# Patient Record
Sex: FEMALE | URBAN - METROPOLITAN AREA
[De-identification: names, ages, dates, MRNs, and addresses within clinical notes are randomized per-mention and may not be internally consistent; named-entity substitution may affect disease eponyms.]

---

## 2018-11-28 ENCOUNTER — APPOINTMENT (OUTPATIENT)
Age: 38
Setting detail: DERMATOLOGY
End: 2018-11-29

## 2018-11-28 DIAGNOSIS — L72.0 EPIDERMAL CYST: ICD-10-CM

## 2018-11-28 DIAGNOSIS — L82.0 INFLAMED SEBORRHEIC KERATOSIS: ICD-10-CM

## 2018-11-28 DIAGNOSIS — L81.4 OTHER MELANIN HYPERPIGMENTATION: ICD-10-CM

## 2018-11-28 PROCEDURE — OTHER REASSURANCE: OTHER

## 2018-11-28 PROCEDURE — OTHER BENIGN DESTRUCTION: OTHER

## 2018-11-28 PROCEDURE — OTHER ADDITIONAL NOTES: OTHER

## 2018-11-28 PROCEDURE — OTHER INTRALESIONAL KENALOG: OTHER

## 2018-11-28 PROCEDURE — 17110 DESTRUCT B9 LESION 1-14: CPT

## 2018-11-28 PROCEDURE — 99202 OFFICE O/P NEW SF 15 MIN: CPT | Mod: 25

## 2018-11-28 PROCEDURE — 11900 INJECT SKIN LESIONS </W 7: CPT | Mod: 59

## 2018-11-28 ASSESSMENT — LOCATION DETAILED DESCRIPTION DERM
LOCATION DETAILED: RIGHT INFERIOR CENTRAL MALAR CHEEK
LOCATION DETAILED: LEFT POSTERIOR SHOULDER
LOCATION DETAILED: LEFT MEDIAL DORSAL WRIST
LOCATION DETAILED: LEFT CENTRAL MALAR CHEEK

## 2018-11-28 ASSESSMENT — LOCATION SIMPLE DESCRIPTION DERM
LOCATION SIMPLE: LEFT SHOULDER
LOCATION SIMPLE: LEFT CHEEK
LOCATION SIMPLE: LEFT WRIST
LOCATION SIMPLE: RIGHT CHEEK

## 2018-11-28 ASSESSMENT — LOCATION ZONE DERM
LOCATION ZONE: FACE
LOCATION ZONE: ARM

## 2018-11-28 NOTE — PROCEDURE: BENIGN DESTRUCTION
Add 52 Modifier (Optional): no
Treatment Number (Will Not Render If 0): 0
Medical Necessity Information: It is in your best interest to select a reason for this procedure from the list below. All of these items fulfill various CMS LCD requirements except the new and changing color options.
Render Post-Care Instructions In Note?: yes
Post-Care Instructions: I reviewed with the patient in detail post-care instructions. Patient is to wear sunprotection, and avoid picking at any of the treated lesions. Pt may apply Vaseline to crusted or scabbing areas.
Anesthesia Volume In Cc: 0.5
Consent: The patient's consent was obtained including but not limited to risks of crusting, scabbing, blistering, scarring, darker or lighter pigmentary change, recurrence, incomplete removal and infection.
Medical Necessity Clause: This procedure was medically necessary because the lesions that were treated were:
Detail Level: Detailed

## 2018-11-28 NOTE — PROCEDURE: INTRALESIONAL KENALOG
Treatment Number (Optional): 1
Detail Level: Simple
X Size Of Lesion In Cm (Optional): 0
Administered By (Optional): nurse
Kenalog Preparation: Kenalog
Consent: The risks of atrophy were reviewed with the patient.
Medical Necessity Clause: This procedure was medically necessary because the lesions that were treated were:
Concentration Of Solution Injected (Mg/Ml): 3.0
Include Z78.9 (Other Specified Conditions Influencing Health Status) As An Associated Diagnosis?: No

## 2018-11-28 NOTE — HPI: CYST
How Severe Is Your Cyst?: moderate
Is This A New Presentation, Or A Follow-Up?: Cyst
Additional History: Has drained 3 times marisol past 6 years.  Has been taking cephalexin.

## 2018-12-20 ENCOUNTER — APPOINTMENT (OUTPATIENT)
Age: 38
Setting detail: DERMATOLOGY
End: 2018-12-21

## 2018-12-20 DIAGNOSIS — L81.4 OTHER MELANIN HYPERPIGMENTATION: ICD-10-CM

## 2018-12-20 DIAGNOSIS — L72.0 EPIDERMAL CYST: ICD-10-CM

## 2018-12-20 PROCEDURE — OTHER REASSURANCE: OTHER

## 2018-12-20 PROCEDURE — 11402 EXC TR-EXT B9+MARG 1.1-2 CM: CPT

## 2018-12-20 PROCEDURE — 12031 INTMD RPR S/A/T/EXT 2.5 CM/<: CPT

## 2018-12-20 PROCEDURE — OTHER PUNCH EXCISION: OTHER

## 2018-12-20 PROCEDURE — 99213 OFFICE O/P EST LOW 20 MIN: CPT | Mod: 25

## 2018-12-20 ASSESSMENT — LOCATION ZONE DERM
LOCATION ZONE: FACE
LOCATION ZONE: TRUNK

## 2018-12-20 ASSESSMENT — LOCATION SIMPLE DESCRIPTION DERM
LOCATION SIMPLE: RIGHT CHEEK
LOCATION SIMPLE: LEFT CHEEK
LOCATION SIMPLE: LEFT UPPER BACK

## 2018-12-20 ASSESSMENT — LOCATION DETAILED DESCRIPTION DERM
LOCATION DETAILED: LEFT SUPERIOR UPPER BACK
LOCATION DETAILED: RIGHT INFERIOR CENTRAL MALAR CHEEK
LOCATION DETAILED: LEFT CENTRAL MALAR CHEEK

## 2018-12-20 NOTE — PROCEDURE: PUNCH EXCISION
Deep Sutures: 3-0 Vicryl
X Size Of Lesion In Cm (Optional): 0
Purse String (Intermediate) Text: Given the location of the defect and the characteristics of the surrounding skin a purse string intermediate closure was deemed most appropriate.  Undermining was performed circumfirentially around the surgical defect.  A purse string suture was then placed and tightened.
Billing Type: Third-Party Bill
4.5 Mm Punch Excision Text: A 4.5 mm punch biopsy was used to make an initial incision over the lesion.  After this overlying column of skin was removed, blunt dissection was used to free the lesion from the surrounding tissues and the lesion was extirpated through the surgical opening made by the punch biopsy.
10 Mm Punch Excision Text: A 10 mm punch biopsy was used to make an initial incision over the lesion.  After this overlying column of skin was removed, blunt dissection was used to free the lesion from the surrounding tissues and the lesion was extirpated through the surgical opening made by the punch biopsy.
Suture Removal: 14 days
6 Mm Punch Excision Text: A 6 mm punch biopsy was used to make an initial incision over the lesion.  After this overlying column of skin was removed, blunt dissection was used to free the lesion from the surrounding tissues and the lesion was extirpated through the surgical opening made by the punch biopsy.
Hemostasis: Pressure
12 Mm Punch Excision Text: A 12 mm punch biopsy was used to make an initial incision over the lesion.  After this overlying column of skin was removed, blunt dissection was used to free the lesion from the surrounding tissues and the lesion was extirpated through the surgical opening made by the punch biopsy.
Dressing: pressure dressing
5 Mm Punch Excision Text: A 5 mm punch biopsy was used to make an initial incision over the lesion.  After this overlying column of skin was removed, blunt dissection was used to free the lesion from the surrounding tissues and the lesion was extirpated through the surgical opening made by the punch biopsy.
Render Post-Care Instructions In Note?: yes
3.5 Mm Punch Excision Text: A 3.5 mm punch biopsy was used to make an initial incision over the lesion.  After this overlying column of skin was removed, blunt dissection was used to free the lesion from the surrounding tissues and the lesion was extirpated through the surgical opening made by the punch biopsy.
Anesthesia Type: 1% lidocaine with epinephrine
4 Mm Punch Excision Text: A 4 mm punch biopsy was used to make an initial incision over the lesion.  After this overlying column of skin was removed, blunt dissection was used to free the lesion from the surrounding tissues and the lesion was extirpated through the surgical opening made by the punch biopsy.
Wound Care: Petrolatum
Intermediate Repair Preamble Text (Leave Blank If You Do Not Want): Undermining was performed with blunt dissection.
Medical Necessity Clause: The excision was medically necessary because the lesion which was excised was
Bill For Surgical Tray: no
Size Of Lesion In Cm: 2
Repair Type: Intermediate
Epidermal Sutures: 3-0 Nylon
1.5 Mm Punch Excision Text: A 1.5 mm punch biopsy was used to make an initial incision over the lesion.  After this overlying column of skin was removed, blunt dissection was used to free the lesion from the surrounding tissues and the lesion was extirpated through the surgical opening made by the punch biopsy.
Estimated Blood Loss (Cc): 2 cc
Path Notes (To The Dermatopathologist): Please check margins.
7 Mm Punch Excision Text: A 7 mm punch biopsy was used to make an initial incision over the lesion.  After this overlying column of skin was removed, blunt dissection was used to free the lesion from the surrounding tissues and the lesion was extirpated through the surgical opening made by the punch biopsy.
2 Mm Punch Excision Text: A 2 mm punch biopsy was used to make an initial incision over the lesion.  After this overlying column of skin was removed, blunt dissection was used to free the lesion from the surrounding tissues and the lesion was extirpated through the surgical opening made by the punch biopsy.
Medical Necessity Clause: This procedure was medically necessary because the lesion that was treated was:
Consent was obtained from the patient. The risks and benefits to therapy were discussed in detail. Specifically, the risks of infection, scarring, bleeding, prolonged wound healing, incomplete removal, allergy to anesthesia, nerve injury and recurrence were addressed. Prior to the procedure, the treatment site was clearly identified and confirmed by the patient. All components of Universal Protocol/PAUSE Rule completed.
8 Mm Punch Excision Text: A 8 mm punch biopsy was used to make an initial incision over the lesion.  After this overlying column of skin was removed, blunt dissection was used to free the lesion from the surrounding tissues and the lesion was extirpated through the surgical opening made by the punch biopsy.
Excision Depth: adipose tissue
Anesthesia Type: 0.5% marcaine
Post-Care Instructions: I reviewed with the patient in detail post-care instructions. Patient is not to engage in any heavy lifting, exercise, or swimming for the next 14 days. Should the patient develop any fevers, chills, bleeding, severe pain patient will contact the office immediately.
2.5 Mm Punch Excision Text: A 2.5 mm punch biopsy was used to make an initial incision over the lesion.  After this overlying column of skin was removed, blunt dissection was used to free the lesion from the surrounding tissues and the lesion was extirpated through the surgical opening made by the punch biopsy.
Intermediate / Complex Repair - Final Wound Length In Cm: 1.6
3 Mm Punch Excision Text: A 3 mm punch biopsy was used to make an initial incision over the lesion.  After this overlying column of skin was removed, blunt dissection was used to free the lesion from the surrounding tissues and the lesion was extirpated through the surgical opening made by the punch biopsy.
Epidermal Closure: simple interrupted
Complex Repair Preamble Text (Leave Blank If You Do Not Want): Extensive wide undermining was performed.
Anesthesia Volume In Cc: 4
Excision Method: 8 mm Punch
Additional Anesthesia Volume In Cc: 3
Detail Level: Detailed

## 2019-01-03 ENCOUNTER — APPOINTMENT (OUTPATIENT)
Age: 39
Setting detail: DERMATOLOGY
End: 2019-01-03

## 2019-01-03 DIAGNOSIS — Z48.02 ENCOUNTER FOR REMOVAL OF SUTURES: ICD-10-CM

## 2019-01-03 PROCEDURE — OTHER SUTURE REMOVAL (GLOBAL PERIOD): OTHER

## 2019-01-03 ASSESSMENT — LOCATION ZONE DERM: LOCATION ZONE: TRUNK

## 2019-01-03 ASSESSMENT — LOCATION SIMPLE DESCRIPTION DERM: LOCATION SIMPLE: LEFT UPPER BACK

## 2019-01-03 ASSESSMENT — LOCATION DETAILED DESCRIPTION DERM: LOCATION DETAILED: LEFT SUPERIOR UPPER BACK

## 2019-01-03 NOTE — PROCEDURE: SUTURE REMOVAL (GLOBAL PERIOD)
Add 03099 Cpt? (Important Note: In 2017 The Use Of 23921 Is Being Tracked By Cms To Determine Future Global Period Reimbursement For Global Periods): no
Detail Level: Detailed

## 2019-05-16 PROBLEM — E66.01 OBESITY, MORBID (HCC): Status: ACTIVE | Noted: 2019-05-16

## 2019-06-25 ENCOUNTER — OFFICE VISIT (OUTPATIENT)
Dept: CARDIOLOGY CLINIC | Age: 39
End: 2019-06-25

## 2019-06-25 VITALS
HEIGHT: 68 IN | BODY MASS INDEX: 44.41 KG/M2 | WEIGHT: 293 LBS | OXYGEN SATURATION: 98 % | RESPIRATION RATE: 16 BRPM | SYSTOLIC BLOOD PRESSURE: 114 MMHG | DIASTOLIC BLOOD PRESSURE: 82 MMHG | HEART RATE: 70 BPM

## 2019-06-25 DIAGNOSIS — G90.9 AUTONOMIC DISORDER: ICD-10-CM

## 2019-06-25 DIAGNOSIS — E78.2 MIXED HYPERLIPIDEMIA: ICD-10-CM

## 2019-06-25 DIAGNOSIS — E78.2 ELEVATED TRIGLYCERIDES WITH HIGH CHOLESTEROL: ICD-10-CM

## 2019-06-25 DIAGNOSIS — I10 ESSENTIAL HYPERTENSION: ICD-10-CM

## 2019-06-25 DIAGNOSIS — E11.42 TYPE 2 DIABETES MELLITUS WITH DIABETIC POLYNEUROPATHY, WITH LONG-TERM CURRENT USE OF INSULIN (HCC): Primary | ICD-10-CM

## 2019-06-25 DIAGNOSIS — E66.01 MORBID OBESITY (HCC): ICD-10-CM

## 2019-06-25 DIAGNOSIS — Z79.4 TYPE 2 DIABETES MELLITUS WITH DIABETIC POLYNEUROPATHY, WITH LONG-TERM CURRENT USE OF INSULIN (HCC): Primary | ICD-10-CM

## 2019-06-25 DIAGNOSIS — M54.30 SCIATICA, UNSPECIFIED LATERALITY: ICD-10-CM

## 2019-06-25 DIAGNOSIS — R00.0 TACHYCARDIA, UNSPECIFIED: ICD-10-CM

## 2019-06-25 RX ORDER — MEGESTROL ACETATE 20 MG/1
20 TABLET ORAL 2 TIMES DAILY
COMMUNITY
End: 2022-09-18

## 2019-06-25 NOTE — PROGRESS NOTES
Mary Jo Villagomez is a 35 y.o. female is here to establish local cardiac care, recently moved here from [place]. Complex medical hx with uncontrolled DM II--now on insulin pump, morbid obesity, hypertension, inappropriate (sinus) tachycardia, autonomic dysfunction, dyslipidemia (w/ elev TG/metabolic syndrome low HDL), peripheral neuropathy(lyrica/tramadol), GERD, vit D deficiency. Prior cardiac testing in [place] about 8 mos ago with Echo/doppler--reportedly ok, attempted Tilt Table study, ??stress. On metoprolol for tachy (and ortho), dose recently increased to 50mg bid. PCP at [facility]. Labs and OV 5/29/19--HbA1c 8.9, chol 141, LDL 53, HDL 27,  on crestor 10 + lofibra 160.  ? Not currently on ACEI or ARB? Limited outside records available. No hx thryoid. No hx JOHANNE or w/u (says would refuse CPAP anyway). Back pain/sciatica, knee pain, peripheral neuropathy, ?fibromyalgia limited physical.  The patient denies chest pain/ shortness of breath, orthopnea, PND, LE edema, palpitations, syncope, presyncope or fatigue.        Patient Active Problem List    Diagnosis Date Noted    Diabetes ([address]     HTN (hypertension)     Hyperlipemia     Peripheral neuropathy     Morbid obesity ([address]     GERD (gastroesophageal reflux disease)     Elevated triglycerides with high cholesterol     Obesity, morbid ([address] 05/16/2019      Alize Rondon NP  Past Medical History:   Diagnosis Date    Depression     Diabetes ([address]     on insulin pump    Dysmenorrhea     Elevated triglycerides with high cholesterol     GERD (gastroesophageal reflux disease)     HTN (hypertension)     Hyperlipemia     Morbid obesity ([address]     Peripheral neuropathy     Vitamin D deficiency       Past Surgical History:   Procedure Laterality Date    HX LAP CHOLECYSTECTOMY      HX SEPTOPLASTY      HX TONSIL AND ADENOIDECTOMY       Allergies   Allergen Reactions    Lidocaine Rash and Swelling    Advair Diskus [Fluticasone Propion-Salmeterol] Rash    Pcn [Penicillins] Unknown (comments)    Sulfa (Sulfonamide Antibiotics) Rash and Swelling      Family History   Problem Relation Age of Onset    Diabetes Mother     Hypertension Father     Diabetes Father     Cancer Maternal Grandmother       Social History     Socioeconomic History    Marital status: SINGLE     Spouse name: Not on file    Number of children: Not on file    Years of education: Not on file    Highest education level: Not on file   Occupational History    Not on file   Social Needs    Financial resource strain: Not on file    Food insecurity:     Worry: Not on file     Inability: Not on file    Transportation needs:     Medical: Not on file     Non-medical: Not on file   Tobacco Use    Smoking status: Never Smoker    Smokeless tobacco: Never Used   Substance and Sexual Activity    Alcohol use: Never     Frequency: Never    Drug use: Not on file    Sexual activity: Not on file   Lifestyle    Physical activity:     Days per week: Not on file     Minutes per session: Not on file    Stress: Not on file   Relationships    Social connections:     Talks on phone: Not on file     Gets together: Not on file     Attends Yazidi service: Not on file     Active member of club or organization: Not on file     Attends meetings of clubs or organizations: Not on file     Relationship status: Not on file    Intimate partner violence:     Fear of current or ex partner: Not on file     Emotionally abused: Not on file     Physically abused: Not on file     Forced sexual activity: Not on file   Other Topics Concern    Not on file   Social History Narrative    Not on file      Current Outpatient Medications   Medication Sig    megestrol (MEGACE) 20 mg tablet Take 20 mg by mouth two (2) times a day.  omeprazole (PRILOSEC) 40 mg capsule Take 1 Cap by mouth daily.  furosemide (LASIX) 20 mg tablet Take one daily as needed for lower extremity edema.   Indications: visible water retention    traMADol (ULTRAM) 50 mg tablet Take 50 mg by mouth every six (6) hours as needed.  paliperidone palmitate (INVEGA SUSTENNA) 78 mg/0.5 mL injection by IntraMUSCular route once. Extended release    potassium chloride SR (KLOR-CON 8) 8 mEq tablet Take 1 Tab by mouth two (2) times a day.  metoprolol tartrate (LOPRESSOR) 50 mg tablet Take 1 Tab by mouth two (2) times a day.  ergocalciferol (ERGOCALCIFEROL) 50,000 unit capsule Take 1 Cap by mouth every seven (7) days. Indications: Vitamin D Deficiency (High Dose Therapy)    fenofibrate (LOFIBRA) 160 mg tablet Take 1 Tab by mouth daily. Indications: high amount of triglyceride in the blood    meloxicam (MOBIC) 7.5 mg tablet Take 2 Tabs by mouth daily. Indications: Joint Damage causing Pain and Loss of Function    rosuvastatin (CRESTOR) 10 mg tablet Take 1 Tab by mouth nightly.  nystatin (MYCOSTATIN) 100,000 unit/mL suspension Take 2 mL by mouth four (4) times daily. swish and spit  Indications: thrush    pregabalin (LYRICA) 75 mg capsule Take 1 Cap by mouth three (3) times daily. Max Daily Amount: 225 mg. Indications: Diabetic Complication causing Injury to some Body Nerves    insulin CONCENTRATED regular (HUMULIN R U-500) 500 unit/mL soln 1.6 Units by SubCUTAneous route continuous. Via pump   Indications: Diabetes Mellitus with Severe Insulin Resistance     No current facility-administered medications for this visit. Review of Symptoms:    CONST  No weight change. No fever, chills, sweats    ENT No visual changes, URI sx, sore throat    CV  See HPI   RESP  No cough, or sputum, wheezing. Also see HPI   GI  No abdominal pain or change in bowel habits. No heartburn or dysphagia. No melena or rectal bleeding.   No dysuria, urgency, frequency, hematuria   MSKEL  No joint pain, swelling. No muscle pain. SKIN  No rash or lesions. NEURO  No headache, syncope, or seizure.    No weakness, loss of sensation, or paresthesias. PSYCH  No low mood or depression  No anxiety. HE/LYMPH  No easy bruising, abnormal bleeding, or enlarged glands. Physical ExamPhysical Exam:    Visit Vitals  /82 (BP 1 Location: Right arm, BP Patient Position: Sitting)   Pulse 70   Resp 16   Ht 5' 7.5\" (1.715 m)   Wt 349 lb (158.3 kg)   LMP 06/18/2019   SpO2 98% Comment: ra   BMI 53.85 kg/m²     Gen: NAD obese WF  HEENT:  PERRL, throat clear  Neck: no adenopathy, no thyromegaly, no JVD   Heart:  Regular,Nl S1S2,  no murmur, gallop or rub.   Lungs:  clear  Abdomen:   Soft, non-tender, bowel sounds are active.   Extremities:  No edema  Pulse: symmetric  Neuro: A&O times 3, No focal neuro deficits    Cardiographics    ECG: NSR, PRWP    Labs:   Lab Results   Component Value Date/Time    Sodium 146 (H) 05/29/2019 12:00 AM    Potassium 4.6 05/29/2019 12:00 AM    Chloride 101 05/29/2019 12:00 AM    CO2 26 05/29/2019 12:00 AM    Glucose 115 (H) 05/29/2019 12:00 AM    BUN 12 05/29/2019 12:00 AM    Creatinine 0.87 05/29/2019 12:00 AM    BUN/Creatinine ratio 14 05/29/2019 12:00 AM    GFR est AA 96 05/29/2019 12:00 AM    GFR est non-AA 84 05/29/2019 12:00 AM    Calcium 9.6 05/29/2019 12:00 AM    Bilirubin, total 0.2 05/29/2019 12:00 AM    AST (SGOT) 22 05/29/2019 12:00 AM    Alk. phosphatase 75 05/29/2019 12:00 AM    Protein, total 7.0 05/29/2019 12:00 AM    Albumin 4.2 05/29/2019 12:00 AM    A-G Ratio 1.5 05/29/2019 12:00 AM    ALT (SGPT) 21 05/29/2019 12:00 AM     No results found for: CPK, CPKX, CPX  Lab Results   Component Value Date/Time    Cholesterol, total 141 05/29/2019 12:00 AM    HDL Cholesterol 27 (L) 05/29/2019 12:00 AM    LDL, calculated 53 05/29/2019 12:00 AM    Triglyceride 307 (H) 05/29/2019 12:00 AM     No results found for this or any previous visit.     Assessment:         Patient Active Problem List    Diagnosis Date Noted    Diabetes ([facility]     HTN (hypertension)     Hyperlipemia     Peripheral neuropathy     Morbid obesity ([facility]     GERD (gastroesophageal reflux disease)     Elevated triglycerides with high cholesterol     Obesity, morbid ([facility] 05/16/2019     1 y.o. female is here to establish local cardiac care, recently moved here from [place]. Complex medical hx with uncontrolled DM II--now on insulin pump, morbid obesity, hypertension, inappropriate (sinus) tachycardia, autonomic dysfunction, dyslipidemia (w/ elev TG/metabolic syndrome low HDL), peripheral neuropathy(lyrica/tramadol), GERD, vit D deficiency. Prior cardiac testing in [place] about 8 mos ago with Echo/doppler--reportedly ok, attempted Tilt Table study, ??stress. On metoprolol for tachy (and ortho), dose recently increased to 50mg bid. PCP at [facility]. Labs and OV 5/29/19--HbA1c 8.9, chol 141, LDL 53, HDL 27,  on crestor 10 + lofibra 160.  ? Not currently on ACEI or ARB? Limited outside records available. No hx thryoid. No hx JOHANNE or w/u (says would refuse CPAP anyway). Back pain/sciatica, knee pain, peripheral neuropathy, ?fibromyalgia limited physical.      Plan: Will obtain/review outside Cardiology testing/notes (Echo 8 mos ago, Tilt, ? Stress)  Continue metoprolol 50mg bid (tachy, autonomic)  Says BP drops and runs low, ?? Very low dose ACEI or ARB for renal protection  Continue statin and fenofibrate  Endocrine/DM management, pump, etc  Discussed sleep test/JOHANNE--declines  Dietary rx  Continue current care and f/u in 6 months.     Nate Miranda MD

## 2019-06-25 NOTE — PROGRESS NOTES
Verified patient with two patient identifiers. Medications reviewed/approved by Dr. Beth Dahl. A verbal from Dr. Beth Dahl was given to remove any medications that were deleted during the visit. Medication(s) removed:  None    Chief Complaint   Patient presents with    Establish Care     New patient evaluation    Hypertension    Cholesterol Problem     1. Have you been to the ER, urgent care clinic since your last visit? Hospitalized since your last visit? new pt    2. Have you seen or consulted any other health care providers outside of the [address] [place] [address] since your last visit? Include any pap smears or colon screening.  New pt

## 2019-06-27 ENCOUNTER — TELEPHONE (OUTPATIENT)
Dept: CARDIOLOGY CLINIC | Age: 39
End: 2019-06-27

## 2019-06-27 NOTE — TELEPHONE ENCOUNTER
Called back to give you the Doctor's info and Tn and she has a few questions.   Please call [phone]

## 2019-06-27 NOTE — TELEPHONE ENCOUNTER
Spoke with the pt. Verified patient with two patient identifiers. Fax for [facility] is [phone]. Pt also needs cardiac clearance per Dr. Earle Johnson and Nicholas Burns NP. Advised that the office should call Dr. Marisol Aguiar office regarding so we know what type of surgery (scheduled for mid July). Also, advised that we need her [address] [place] [address] records. Faxed consent and confirmation has been received.

## 2019-07-05 PROBLEM — L02.91 ABSCESS: Status: ACTIVE | Noted: 2019-07-05

## 2019-07-10 ENCOUNTER — OFFICE VISIT (OUTPATIENT)
Dept: SURGERY | Age: 39
End: 2019-07-10

## 2019-07-10 VITALS
WEIGHT: 293 LBS | HEIGHT: 68 IN | SYSTOLIC BLOOD PRESSURE: 113 MMHG | HEART RATE: 86 BPM | DIASTOLIC BLOOD PRESSURE: 71 MMHG | TEMPERATURE: 97.9 F | BODY MASS INDEX: 44.41 KG/M2

## 2019-07-10 DIAGNOSIS — L02.91 ABSCESS: Primary | ICD-10-CM

## 2019-07-10 NOTE — PROGRESS NOTES
Chelsea Llamas is a 35 y.o. female who presents today with the following:  Chief Complaint   Patient presents with    Skin Problem     under arm       HPI    69-year-old diabetic female who presents to the office as a referral by Dr. Radha Bloom for evaluation of a left axillary abscess. According to the patient about 2 to 3 weeks ago she noticed some erythema in this area that fairly rapidly increased in size. She believes it was a spider bite because she states that her father has had multiple spider bites and stated that this looks similar. She was seen by Dr. bethea and treated with a course of antibiotics. She states that she did not have improvement and was seen back in the office and switch to clindamycin. She has had some spontaneous drainage. There was some concern because she ended up having an area of necrosis in the left axilla and she was referred to us for evaluation. The patient states that she has not had any spontaneous drainage recently. She underwent an ultrasound which showed a 1.7 cm fluid collection without any hypervascularity.     Past Medical History:   Diagnosis Date    Depression     Diabetes ([address]     on insulin pump    Dysmenorrhea     Elevated triglycerides with high cholesterol     GERD (gastroesophageal reflux disease)     HTN (hypertension)     Hyperlipemia     Morbid obesity (HCC)     Peripheral neuropathy     Vitamin D deficiency        Past Surgical History:   Procedure Laterality Date    HX LAP CHOLECYSTECTOMY      HX SEPTOPLASTY      HX TONSIL AND ADENOIDECTOMY         Social History     Socioeconomic History    Marital status: SINGLE     Spouse name: Not on file    Number of children: Not on file    Years of education: Not on file    Highest education level: Not on file   Occupational History    Not on file   Social Needs    Financial resource strain: Not on file    Food insecurity:     Worry: Not on file     Inability: Not on file    Transportation needs: Medical: Not on file     Non-medical: Not on file   Tobacco Use    Smoking status: Never Smoker    Smokeless tobacco: Never Used   Substance and Sexual Activity    Alcohol use: Never     Frequency: Never    Drug use: Not on file    Sexual activity: Not on file   Lifestyle    Physical activity:     Days per week: Not on file     Minutes per session: Not on file    Stress: Not on file   Relationships    Social connections:     Talks on phone: Not on file     Gets together: Not on file     Attends [employer] service: Not on file     Active member of club or organization: Not on file     Attends meetings of clubs or organizations: Not on file     Relationship status: Not on file    Intimate partner violence:     Fear of current or ex partner: Not on file     Emotionally abused: Not on file     Physically abused: Not on file     Forced sexual activity: Not on file   Other Topics Concern    Not on file   Social History Narrative    Not on file       Family History   Problem Relation Age of Onset    Diabetes Mother     Hypertension Father     Diabetes Father     Cancer Maternal Grandmother        Allergies   Allergen Reactions    Lidocaine Rash and Swelling    Advair Diskus [Fluticasone Propion-Salmeterol] Rash    Pcn [Penicillins] Unknown (comments)    Sulfa (Sulfonamide Antibiotics) Rash and Swelling       Current Outpatient Medications   Medication Sig    clindamycin (CLEOCIN) 300 mg capsule Take 2 Caps by mouth three (3) times daily for 10 days.  megestrol (MEGACE) 20 mg tablet Take 20 mg by mouth two (2) times a day.  nystatin (MYCOSTATIN) 100,000 unit/mL suspension Take 2 mL by mouth four (4) times daily. swish and spit  Indications: thrush    omeprazole (PRILOSEC) 40 mg capsule Take 1 Cap by mouth daily.  furosemide (LASIX) 20 mg tablet Take one daily as needed for lower extremity edema.   Indications: visible water retention    pregabalin (LYRICA) 75 mg capsule Take 1 Cap by mouth three (3) times daily. Max Daily Amount: 225 mg. Indications: Diabetic Complication causing Injury to some Body Nerves    traMADol (ULTRAM) 50 mg tablet Take 50 mg by mouth every six (6) hours as needed.  paliperidone palmitate (INVEGA SUSTENNA) 78 mg/0.5 mL injection by IntraMUSCular route once. Extended release    potassium chloride SR (KLOR-CON 8) 8 mEq tablet Take 1 Tab by mouth two (2) times a day.  metoprolol tartrate (LOPRESSOR) 50 mg tablet Take 1 Tab by mouth two (2) times a day.  ergocalciferol (ERGOCALCIFEROL) 50,000 unit capsule Take 1 Cap by mouth every seven (7) days. Indications: Vitamin D Deficiency (High Dose Therapy)    fenofibrate (LOFIBRA) 160 mg tablet Take 1 Tab by mouth daily. Indications: high amount of triglyceride in the blood    meloxicam (MOBIC) 7.5 mg tablet Take 2 Tabs by mouth daily. Indications: Joint Damage causing Pain and Loss of Function    rosuvastatin (CRESTOR) 10 mg tablet Take 1 Tab by mouth nightly.  insulin CONCENTRATED regular (HUMULIN R U-500) 500 unit/mL soln 1.6 Units by SubCUTAneous route continuous. Via pump   Indications: Diabetes Mellitus with Severe Insulin Resistance     No current facility-administered medications for this visit. The above histories, medications and allergies have been reviewed. ROS    Visit Vitals  /71 (BP 1 Location: Left arm, BP Patient Position: Sitting)   Pulse 86   Temp 97.9 °F (36.6 °C) (Oral)   Ht 5' 7.5\" (1.715 m)   Wt 347 lb (157.4 kg)   LMP 06/18/2019   BMI 53.55 kg/m²     Physical Exam   Psychiatric:   In evaluation of the left axilla she has an open area that measures approximately 1-1/2 cm. There is a little bit of fibrinous exudate. This area is gently cleansed with a cotton-tipped applicator and peroxide. This opens into a small cavity but there is no residual pus seen in this. There is minimal induration and no significant erythema. I do not appreciate any residual fluctuance.        Indication: Evaluate for abscess     TECHNIQUE: Superficial ultrasound of the left posterior axilla/left upper flank.     COMPARISON: None     IMPRESSION  FINDINGS/IMPRESSION:  In the patient's area of concern of the left posterior axilla/left upper flank,  there is a focal area of skin thickening/subcutaneous edema with a very  superficial subcutaneous horizontally oriented fluid collection measuring  approximately 1.7 x 0.6 x 0.4 cm. No significant surrounding increased  Vascularity. 1. Abscess  Appears to have been adequately drained. There is minimal induration and erythema. I agree with continuing the course of antibiotics until completed. I have instructed the patient on local wound care. She is currently using warm compresses and therefore she should continue to do this. She states that she cannot shower based on her home situation. Told her if she develops any worsening redness or erythema we will be more than happy to see her back. Follow-up and Dispositions    · Return if symptoms worsen or fail to improve.          Seth Olea MD

## 2019-07-10 NOTE — PATIENT INSTRUCTIONS
Skin Abscess: Care Instructions  Your Care Instructions    A skin abscess is a bacterial infection that forms a pocket of pus. A boil is a kind of skin abscess. The doctor may have cut an opening in the abscess so that the pus can drain out. You may have gauze in the cut so that the abscess will stay open and keep draining. You may need antibiotics. You will need to follow up with your doctor to make sure the infection has gone away. The doctor has checked you carefully, but problems can develop later. If you notice any problems or new symptoms, get medical treatment right away. Follow-up care is a key part of your treatment and safety. Be sure to make and go to all appointments, and call your doctor if you are having problems. It's also a good idea to know your test results and keep a list of the medicines you take. How can you care for yourself at home? · Apply warm and dry compresses, a heating pad set on low, or a hot water bottle 3 or 4 times a day for pain. Keep a cloth between the heat source and your skin. · If your doctor prescribed antibiotics, take them as directed. Do not stop taking them just because you feel better. You need to take the full course of antibiotics. · Take pain medicines exactly as directed. ? If the doctor gave you a prescription medicine for pain, take it as prescribed. ? If you are not taking a prescription pain medicine, ask your doctor if you can take an over-the-counter medicine. · Keep your bandage clean and dry. Change the bandage whenever it gets wet or dirty, or at least one time a day. · If the abscess was packed with gauze:  ? Keep follow-up appointments to have the gauze changed or removed. If the doctor instructed you to remove the gauze, follow the instructions you were given for how to remove it. ? After the gauze is removed, soak the area in warm water for 15 to 20 minutes 2 times a day, until the wound closes. When should you call for help?   Call your doctor now or seek immediate medical care if:    · You have signs of worsening infection, such as:  ? Increased pain, swelling, warmth, or redness. ? Red streaks leading from the infected skin. ? Pus draining from the wound. ? A fever.    Watch closely for changes in your health, and be sure to contact your doctor if:    · You do not get better as expected. Where can you learn more? Go to http://keon-carmen.info/. Enter L145 in the search box to learn more about \"Skin Abscess: Care Instructions. \"  Current as of: April 17, 2018  Content Version: 11.9  © 5392-9447 [employer]. Care instructions adapted under license by [employer] (which disclaims liability or warranty for this information). If you have questions about a medical condition or this instruction, always ask your healthcare professional. [address] any warranty or liability for your use of this information.

## 2019-07-10 NOTE — LETTER
7/10/2019 1:38 PM 
 
Patient:  Renata Jo YOB: 1980 Date of Visit: 7/10/2019 Dear Owen Henderson DO 
[address] [facility] [address] [address] VIA In Basket Lenora David NP 
[address] [facility] [address] VIA In Basket 
 : Thank you for referring Ms. Thom Weems to me for evaluation/treatment. Below are the relevant portions of my assessment and plan of care. The area of concern in the left axilla appears to have been drained adequately. She has no significant residual induration or erythema. This ultrasound done did not show a significant fluid collection. A small 1.7 cm area had been seen but I believe this is what is opened to the skin surface. I have encouraged her to continue with the warm compresses and complete the course of antibiotics. I do not think she will require any further intervention. If she develops worsening redness or induration or fluctuance I would be more than happy to see her again. If you have questions, please do not hesitate to call me. I look forward to following Ms. Td Rose along with you.  
 
 
 
Sincerely, 
 
 
June Hardin MD

## 2019-07-11 NOTE — TELEPHONE ENCOUNTER
Surgery: 8/14/19 with Dr. Tahira Herbertam - Hysterectomy D&C w/ possible myosure    Asked Dr. Joseph Rogers regarding review of records. Awaiting his response.

## 2019-07-15 ENCOUNTER — TELEPHONE (OUTPATIENT)
Dept: CARDIOLOGY CLINIC | Age: 39
End: 2019-07-15

## 2019-07-15 NOTE — TELEPHONE ENCOUNTER
Patient called stating she needs Cardiac Clearance for a Female Surgery Chart indicates Hysteroscopy Dilation & Curettage. Please call [phone].

## 2019-07-16 NOTE — TELEPHONE ENCOUNTER
Per Dr. Zee Bhatia:  Outside records reviewed--clear for planned surgery from cardiac standpoint at low cardiac risk[place] [facility] [place]     [place] filled out by Dr. Zee Bhatia. Faxed to [phone]. Confirmation received.

## 2019-08-07 PROBLEM — E11.40 TYPE 2 DIABETES MELLITUS WITH DIABETIC NEUROPATHY (HCC): Status: ACTIVE | Noted: 2019-08-07

## 2019-08-07 RX ORDER — POTASSIUM CHLORIDE 600 MG/1
1 TABLET, FILM COATED, EXTENDED RELEASE ORAL 2 TIMES DAILY
COMMUNITY
Start: 2019-05-16 | End: 2022-09-12

## 2019-08-07 RX ORDER — VALACYCLOVIR HYDROCHLORIDE 500 MG/1
1 TABLET, FILM COATED ORAL 2 TIMES DAILY
COMMUNITY
Start: 2019-08-06 | End: 2022-09-18

## 2019-08-07 NOTE — PERIOP NOTES
[facility]  Ambulatory Surgery Unit  Pre-operative Instructions    Surgery/Procedure Date  8/14            Tentative Arrival Time 12:15 am      1. On the day of your surgery/procedure, please report to the Ambulatory Surgery Unit Registration Desk and sign in at your designated time. The Ambulatory Surgery Unit is located in [facility] on the Critical access hospital side of the [facility] across from the [address] [place] [address]. Please have all of your health insurance cards and a photo ID. 2. You must have someone with you to drive you home, as you should not drive a car for 24 hours following anesthesia. Please make arrangements for a responsible adult friend or family member to stay with you for at least the first 24 hours after your surgery. 3. Do not have anything to eat or drink (including water, gum, mints, coffee, juice) after 11:59 PM  8/13. This may not apply to medications prescribed by your physician. (Please note below the special instructions with medications to take the morning of surgery, if applicable.)    4. We recommend you do not drink any alcoholic beverages for 24 hours before and after your surgery. 5. Contact your surgeons office for instructions on the following medications: non-steroidal anti-inflammatory drugs (i.e. Advil, Aleve), vitamins, and supplements. (Some surgeons will want you to stop these medications prior to surgery and others may allow you to take them)   **If you are currently taking Plavix, Coumadin, Aspirin and/or other blood-thinning agents, contact your surgeon for instructions. ** Your surgeon will partner with the physician prescribing these medications to determine if it is safe to stop or if you need to continue taking. Please do not stop taking these medications without instructions from your surgeon.     6. In an effort to help prevent surgical site infection, we ask that you shower with an anti-bacterial soap (i.e. Dial/Safeguard, or the soap provided to you at your preadmission testing appointment) for 3 days prior to and on the morning of surgery, using a fresh towel after each shower. (Please begin this process with fresh bed linens.) Do not apply any lotions, powders, or deodorants after the shower on the day of your procedure. If applicable, please do not shave the operative site for 48 hours prior to surgery. 7. Wear comfortable clothes. Wear glasses instead of contacts. Do not bring any jewelry or money (other than copays or fees as instructed). Do not wear make-up, particularly mascara, the morning of your surgery. Do not wear nail polish, particularly if you are having foot /hand surgery. Wear your hair loose or down, no ponytails, buns, edd pins or clips. All body piercings must be removed. 8. You should understand that if you do not follow these instructions your surgery may be cancelled. If your physical condition changes (i.e. fever, cold or flu) please contact your surgeon as soon as possible. 9. It is important that you be on time. If a situation occurs where you may be late, or if you have any questions or problems, please call [phone].    10. Your surgery time may be subject to change. You will receive a phone call the day prior to surgery to confirm your arrival time. Special Instructions: Take all medications and inhalers, as prescribed, on the morning of surgery with a sip of water EXCEPT: diabetic medications and call endocrinology for directions re insulin pump. Insulin Dependent Diabetic patients: Take your diabetic medications as prescribed the day before surgery. Hold all diabetic medications the day of surgery. If you are scheduled to arrive for surgery after 8:00 AM, and your AM blood sugar is >200, please call Ambulatory Surgery. I understand a pre-operative phone call will be made to verify my surgery time.   In the event that I am not available, I give permission for a message to be left on my answering service and/or with another person?       yes      Reviewed by phone with pt, verbalized understanding   ___________________      ___________________      ________________  (Signature of Patient)          (Witness)                   (Date and Time)

## 2019-08-07 NOTE — PERIOP NOTES
Spoke with Jenny Lewis, nurse at PCP office. Notified that pt reported endocrinologist as Dr. Desire Gross. Also notified that GYN started pt on valcyclovir for \"rash on her buttocks\"  8/7/19. Per Jenny Lewis, check [facility] 8/8 for clearance. [phone] with Tracey Rivera in Dr. Mis Zavala office. She will follow up with Dr. Beatris Ho re reported rash on patient's \"bottom\". [id number] spoke with radha in Dr. Kennedy Al office. Notified that rash that pt reported must be dry and scabbed to proceed with planned case. She will notify Dr. Beatris Ho.

## 2019-08-08 ENCOUNTER — ANESTHESIA EVENT (OUTPATIENT)
Dept: SURGERY | Age: 39
End: 2019-08-08
Payer: MEDICAID

## 2019-08-08 NOTE — PERIOP NOTES
Call from [facility] in Dr. Elias Thompson office. Per Dr. Sg Farfan, pt does not have shingles. [facility] is aware that case cannot proceed unless areas are dry/scabbed. 1500 Dr. Brian Wright has reviewed preop chart. BMI 52.4, STOP BANG score 5, pt refuses sleep med consult. Including cardio notes and pcp notes. Notified that endocrine note states \"can wear pump- or suspend if surgery is less than a hour (gyn exploratory surgery)\". Ok to proceed.

## 2019-08-13 PROBLEM — N85.01 SIMPLE ENDOMETRIAL HYPERPLASIA: Status: ACTIVE | Noted: 2019-08-13

## 2019-08-13 PROBLEM — N92.0 MENORRHAGIA: Status: ACTIVE | Noted: 2019-08-13

## 2019-08-13 NOTE — PERIOP NOTES
Call from [facility] in Dr. uFentes Lam office.   Pt was seen by Dr. Curtis Lu yesterday and rash on buttock has cleared
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
## 2019-08-14 ENCOUNTER — ANESTHESIA (OUTPATIENT)
Dept: SURGERY | Age: 39
End: 2019-08-14
Payer: MEDICAID

## 2019-08-14 ENCOUNTER — HOSPITAL ENCOUNTER (OUTPATIENT)
Age: 39
Setting detail: OUTPATIENT SURGERY
Discharge: HOME | End: 2019-08-14
Attending: OBSTETRICS & GYNECOLOGY | Admitting: OBSTETRICS & GYNECOLOGY
Payer: MEDICAID

## 2019-08-14 VITALS
WEIGHT: 293 LBS | DIASTOLIC BLOOD PRESSURE: 81 MMHG | HEART RATE: 89 BPM | SYSTOLIC BLOOD PRESSURE: 131 MMHG | HEIGHT: 68 IN | TEMPERATURE: 98.1 F | OXYGEN SATURATION: 94 % | RESPIRATION RATE: 25 BRPM | BODY MASS INDEX: 44.41 KG/M2

## 2019-08-14 LAB
GLUCOSE BLD STRIP.AUTO-MCNC: 162 MG/DL (ref 65–100)
GLUCOSE BLD STRIP.AUTO-MCNC: 188 MG/DL (ref 65–100)
HCG UR QL: NEGATIVE
SERVICE CMNT-IMP: ABNORMAL
SERVICE CMNT-IMP: ABNORMAL

## 2019-08-14 PROCEDURE — 77030026438 HC STYL ET INTUB CARD -A: Performed by: NURSE ANESTHETIST, CERTIFIED REGISTERED

## 2019-08-14 PROCEDURE — 77030037417 HC DEV TISS RMVL HOLO -H: Performed by: OBSTETRICS & GYNECOLOGY

## 2019-08-14 PROCEDURE — 77030018836 HC SOL IRR NACL ICUM -A: Performed by: OBSTETRICS & GYNECOLOGY

## 2019-08-14 PROCEDURE — 74011250636 HC RX REV CODE- 250/636: Performed by: NURSE ANESTHETIST, CERTIFIED REGISTERED

## 2019-08-14 PROCEDURE — 74011250636 HC RX REV CODE- 250/636: Performed by: ANESTHESIOLOGY

## 2019-08-14 PROCEDURE — 77030008684 HC TU ET CUF COVD -B: Performed by: NURSE ANESTHETIST, CERTIFIED REGISTERED

## 2019-08-14 PROCEDURE — 76030000001 HC AMB SURG OR TIME 1 TO 1.5: Performed by: OBSTETRICS & GYNECOLOGY

## 2019-08-14 PROCEDURE — 88305 TISSUE EXAM BY PATHOLOGIST: CPT

## 2019-08-14 PROCEDURE — 77030021352 HC CBL LD SYS DISP COVD -B: Performed by: OBSTETRICS & GYNECOLOGY

## 2019-08-14 PROCEDURE — 74011250636 HC RX REV CODE- 250/636

## 2019-08-14 PROCEDURE — 76210000050 HC AMBSU PH II REC 0.5 TO 1 HR: Performed by: OBSTETRICS & GYNECOLOGY

## 2019-08-14 PROCEDURE — 74011250637 HC RX REV CODE- 250/637: Performed by: ANESTHESIOLOGY

## 2019-08-14 PROCEDURE — 82962 GLUCOSE BLOOD TEST: CPT

## 2019-08-14 PROCEDURE — 77030020255 HC SOL INJ LR 1000ML BG: Performed by: OBSTETRICS & GYNECOLOGY

## 2019-08-14 PROCEDURE — 76060000062 HC AMB SURG ANES 1 TO 1.5 HR: Performed by: OBSTETRICS & GYNECOLOGY

## 2019-08-14 PROCEDURE — 77030033137 HC TBNG OUTFLO AQUILEX ST HOLO -B: Performed by: OBSTETRICS & GYNECOLOGY

## 2019-08-14 PROCEDURE — 76210000034 HC AMBSU PH I REC 0.5 TO 1 HR: Performed by: OBSTETRICS & GYNECOLOGY

## 2019-08-14 PROCEDURE — 77030033136 HC TBNG INFLO AQUILEX ST HOLO -C: Performed by: OBSTETRICS & GYNECOLOGY

## 2019-08-14 PROCEDURE — 81025 URINE PREGNANCY TEST: CPT

## 2019-08-14 PROCEDURE — 74011000250 HC RX REV CODE- 250: Performed by: NURSE ANESTHETIST, CERTIFIED REGISTERED

## 2019-08-14 PROCEDURE — 77030003666 HC NDL SPINAL BD -A: Performed by: OBSTETRICS & GYNECOLOGY

## 2019-08-14 RX ORDER — SUCCINYLCHOLINE CHLORIDE 20 MG/ML
INJECTION INTRAMUSCULAR; INTRAVENOUS AS NEEDED
Status: DISCONTINUED | OUTPATIENT
Start: 2019-08-14 | End: 2019-08-14 | Stop reason: HOSPADM

## 2019-08-14 RX ORDER — SODIUM CHLORIDE 0.9 % (FLUSH) 0.9 %
5-40 SYRINGE (ML) INJECTION AS NEEDED
Status: DISCONTINUED | OUTPATIENT
Start: 2019-08-14 | End: 2019-08-14 | Stop reason: HOSPADM

## 2019-08-14 RX ORDER — ROCURONIUM BROMIDE 10 MG/ML
INJECTION, SOLUTION INTRAVENOUS AS NEEDED
Status: DISCONTINUED | OUTPATIENT
Start: 2019-08-14 | End: 2019-08-14 | Stop reason: HOSPADM

## 2019-08-14 RX ORDER — DIPHENHYDRAMINE HYDROCHLORIDE 50 MG/ML
12.5 INJECTION, SOLUTION INTRAMUSCULAR; INTRAVENOUS AS NEEDED
Status: DISCONTINUED | OUTPATIENT
Start: 2019-08-14 | End: 2019-08-14 | Stop reason: HOSPADM

## 2019-08-14 RX ORDER — ACETAMINOPHEN 10 MG/ML
1000 INJECTION, SOLUTION INTRAVENOUS ONCE
Status: COMPLETED | OUTPATIENT
Start: 2019-08-14 | End: 2019-08-14

## 2019-08-14 RX ORDER — FENTANYL CITRATE 50 UG/ML
25 INJECTION, SOLUTION INTRAMUSCULAR; INTRAVENOUS
Status: DISCONTINUED | OUTPATIENT
Start: 2019-08-14 | End: 2019-08-14 | Stop reason: HOSPADM

## 2019-08-14 RX ORDER — FENTANYL CITRATE 50 UG/ML
INJECTION, SOLUTION INTRAMUSCULAR; INTRAVENOUS AS NEEDED
Status: DISCONTINUED | OUTPATIENT
Start: 2019-08-14 | End: 2019-08-14 | Stop reason: HOSPADM

## 2019-08-14 RX ORDER — SODIUM CHLORIDE, SODIUM LACTATE, POTASSIUM CHLORIDE, CALCIUM CHLORIDE 600; 310; 30; 20 MG/100ML; MG/100ML; MG/100ML; MG/100ML
25 INJECTION, SOLUTION INTRAVENOUS CONTINUOUS
Status: DISCONTINUED | OUTPATIENT
Start: 2019-08-14 | End: 2019-08-14 | Stop reason: HOSPADM

## 2019-08-14 RX ORDER — OXYCODONE AND ACETAMINOPHEN 5; 325 MG/1; MG/1
1 TABLET ORAL
Status: DISCONTINUED | OUTPATIENT
Start: 2019-08-14 | End: 2019-08-14 | Stop reason: HOSPADM

## 2019-08-14 RX ORDER — MIDAZOLAM HYDROCHLORIDE 1 MG/ML
INJECTION, SOLUTION INTRAMUSCULAR; INTRAVENOUS AS NEEDED
Status: DISCONTINUED | OUTPATIENT
Start: 2019-08-14 | End: 2019-08-14 | Stop reason: HOSPADM

## 2019-08-14 RX ORDER — MORPHINE SULFATE 10 MG/ML
2 INJECTION, SOLUTION INTRAMUSCULAR; INTRAVENOUS
Status: DISCONTINUED | OUTPATIENT
Start: 2019-08-14 | End: 2019-08-14 | Stop reason: HOSPADM

## 2019-08-14 RX ORDER — PROPOFOL 10 MG/ML
VIAL (ML) INTRAVENOUS
Status: DISCONTINUED | OUTPATIENT
Start: 2019-08-14 | End: 2019-08-14 | Stop reason: HOSPADM

## 2019-08-14 RX ORDER — HYDROMORPHONE HYDROCHLORIDE 1 MG/ML
.2-.5 INJECTION, SOLUTION INTRAMUSCULAR; INTRAVENOUS; SUBCUTANEOUS ONCE
Status: DISCONTINUED | OUTPATIENT
Start: 2019-08-14 | End: 2019-08-14 | Stop reason: HOSPADM

## 2019-08-14 RX ORDER — SODIUM CHLORIDE 0.9 % (FLUSH) 0.9 %
5-40 SYRINGE (ML) INJECTION EVERY 8 HOURS
Status: DISCONTINUED | OUTPATIENT
Start: 2019-08-14 | End: 2019-08-14 | Stop reason: HOSPADM

## 2019-08-14 RX ORDER — PROPOFOL 10 MG/ML
INJECTION, EMULSION INTRAVENOUS AS NEEDED
Status: DISCONTINUED | OUTPATIENT
Start: 2019-08-14 | End: 2019-08-14 | Stop reason: HOSPADM

## 2019-08-14 RX ORDER — ONDANSETRON 2 MG/ML
INJECTION INTRAMUSCULAR; INTRAVENOUS AS NEEDED
Status: DISCONTINUED | OUTPATIENT
Start: 2019-08-14 | End: 2019-08-14 | Stop reason: HOSPADM

## 2019-08-14 RX ORDER — KETOROLAC TROMETHAMINE 30 MG/ML
INJECTION, SOLUTION INTRAMUSCULAR; INTRAVENOUS AS NEEDED
Status: DISCONTINUED | OUTPATIENT
Start: 2019-08-14 | End: 2019-08-14 | Stop reason: HOSPADM

## 2019-08-14 RX ORDER — ACETAMINOPHEN 10 MG/ML
INJECTION, SOLUTION INTRAVENOUS
Status: COMPLETED
Start: 2019-08-14 | End: 2019-08-14

## 2019-08-14 RX ORDER — OXYCODONE HYDROCHLORIDE 5 MG/1
5 TABLET ORAL
Status: COMPLETED | OUTPATIENT
Start: 2019-08-14 | End: 2019-08-14

## 2019-08-14 RX ADMIN — ACETAMINOPHEN 1000 MG: 10 INJECTION, SOLUTION INTRAVENOUS at 15:18

## 2019-08-14 RX ADMIN — MIDAZOLAM HYDROCHLORIDE 1 MG: 1 INJECTION, SOLUTION INTRAMUSCULAR; INTRAVENOUS at 13:37

## 2019-08-14 RX ADMIN — KETOROLAC TROMETHAMINE 30 MG: 30 INJECTION, SOLUTION INTRAMUSCULAR; INTRAVENOUS at 14:44

## 2019-08-14 RX ADMIN — ROCURONIUM BROMIDE 5 MG: 10 INJECTION INTRAVENOUS at 13:42

## 2019-08-14 RX ADMIN — ONDANSETRON HYDROCHLORIDE 4 MG: 2 INJECTION, SOLUTION INTRAMUSCULAR; INTRAVENOUS at 14:06

## 2019-08-14 RX ADMIN — PROPOFOL 200 MG: 10 INJECTION, EMULSION INTRAVENOUS at 13:42

## 2019-08-14 RX ADMIN — SUCCINYLCHOLINE CHLORIDE 140 MG: 20 INJECTION, SOLUTION INTRAMUSCULAR; INTRAVENOUS at 13:42

## 2019-08-14 RX ADMIN — OXYCODONE HYDROCHLORIDE 5 MG: 5 TABLET ORAL at 16:01

## 2019-08-14 RX ADMIN — PROPOFOL 100 MG: 10 INJECTION, EMULSION INTRAVENOUS at 14:05

## 2019-08-14 RX ADMIN — FENTANYL CITRATE 100 MCG: 50 INJECTION, SOLUTION INTRAMUSCULAR; INTRAVENOUS at 13:42

## 2019-08-14 RX ADMIN — PROPOFOL 100 MG: 10 INJECTION, EMULSION INTRAVENOUS at 14:26

## 2019-08-14 RX ADMIN — PROPOFOL 125 MCG/KG/MIN: 10 INJECTION, EMULSION INTRAVENOUS at 13:51

## 2019-08-14 RX ADMIN — SODIUM CHLORIDE, POTASSIUM CHLORIDE, SODIUM LACTATE AND CALCIUM CHLORIDE 25 ML/HR: 600; 310; 30; 20 INJECTION, SOLUTION INTRAVENOUS at 12:37

## 2019-08-14 RX ADMIN — MIDAZOLAM HYDROCHLORIDE 1 MG: 1 INJECTION, SOLUTION INTRAMUSCULAR; INTRAVENOUS at 13:42

## 2019-08-14 NOTE — PERIOP NOTES
Patient: Tate Suazo MRN: [id number]  SSN: [id number]   YOB: 1980  Age: 35 y.o. Sex: female     Patient is status post Procedure(s): HYSTEROSCOPY, DILATATION AND CURETTAGE, WITH MYOSURE (CHOICE ANES). POLYPECTOMY    Surgeon(s) and Role: Madeline Gallegos MD - Primary                 Peripheral IV 08/14/19 Right Antecubital (Active)   Site Assessment Clean, dry, & intact 8/14/2019 12:34 PM   Phlebitis Assessment 0 8/14/2019 12:34 PM   Infiltration Assessment 0 8/14/2019 12:34 PM   Dressing Status Clean, dry, & intact 8/14/2019 12:34 PM   Dressing Type Tape 8/14/2019 12:34 PM   Hub Color/Line Status Pink; Infusing 8/14/2019 12:34 PM            Airway - Endotracheal Tube 08/14/19 Oral (Active)                   Dressing/Packing:  Wound Vagina-Dressing Type: Yumi-pad (08/14/19 1300)      Other:  FLUID DEFICIT 500 ML, PER DR. Elsa Sevilla.

## 2019-08-14 NOTE — PERIOP NOTES
[facility]  1980  [id number]    Situation:  Verbal report given from: LAWRENCE Hunter and CLAIRE Cai CRNA  Procedure: Procedure(s):   HYSTEROSCOPY, DILATATION AND CURETTAGE, WITH MYOSURE POLYPECTOMY (CHOICE ANES)    Background:    Preoperative diagnosis: THICKENED ENDOMETRIUM   MENORRHAGIA    Postoperative diagnosis: THICKENED ENDOMETRIUM   MENORRHAGIA    :  Dr. Joselito Foster    Assistant(s): Circ-1: Ruth Ortiz RN  Circ-Relief: Mark Patino RN  Scrub Tech-1: Giovany ABDALLA    Specimens:   ID Type Source Tests Collected by Time Destination   1 : ENDOCERVICAL CURETTINGS Preservative Endocervical Curetting  Leatha Clifford MD 8/14/2019 1401 Pathology   2 : ENDOMETRIAL CURETTINGS Preservative Endometrial Curetting  Leatha Clifford MD 8/14/2019 1408 Pathology   3 : Myosure Endometrial Curettings Preservative Endometrial  Leatha Clifford MD 8/14/2019 1443 Pathology       Assessment:  Intra-procedure medications         Anesthesia gave intra-procedure sedation and medications, see anesthesia flow sheet     Intravenous fluids: LR@ KVO     Vital signs stable       Recommendation:    Permission to share finding with mother

## 2019-08-14 NOTE — PERIOP NOTES
1458: Patient received to PACU, VSS. Patient drowsy but arouses to voice. Oxygen sats low 90's on nasal cannula - O2 face tent applied instead. 1504: Patient sats 91% on face tent 10liters. Patient requires stimulation to cough/deep breathe. Dr. Derik Saavedra at bedside. 1514: Patient complaints of pain rated 8/10. Order received from Dr. Derik Saavedra for ofirmev 1000mg IV due to patient's oxygen sats. Per MD, hold off on narcotic pain medication for now. Dr. Natalee Cordova at bedside. 1524: Patient still requiring face tent 10liters to maintain sats 92-93%. Patient encouraged to cough and deep breathe. 1557: Patient resting in bed, patient's mother at bedside. Patient now on room air with oxygen sats 93-94%. Patient states she is still having pain rates as 8/10. Order received from Dr. Derik Saavedra for 5mg PO oxycodone for pain prophylaxis for ride home. Patient tolerating liquids and crackers without issue. Oxycodone given at this time. Discharge instructions given. Patient and mother Shekhar Christensen verbalize understanding of instructions and follow up appointment. Patient and mother state ready for discharge. IV removed. Patient discharged at this time by wheelchair with belongings, mother to provide transportation home.

## 2019-08-14 NOTE — BRIEF OP NOTE
BRIEF OPERATIVE NOTE    Date of Procedure: 8/14/2019   Preoperative Diagnosis: THICKENED ENDOMETRIUM   MENORRHAGIA, fibroid  Postoperative Diagnosis: THICKENED ENDOMETRIUM   MENORRHAGIA, endometrial polyps, fibroids  Procedure(s): HYSTEROSCOPY, DILATATION AND CURETTAGE, WITH MYOSURE POLYPECTOMY (CHOICE ANES)  Surgeon(s) and Role:      Baltazar Scott MD - Primary         Surgical Staff:  Circ-1: Chichi Goodwin RN  Circ-Relief: Savanna Mendez RN  Scrub Tech-1: Sully ABDALLA  Event Time In Time Out   Incision Start 1401    Incision Close 1443      Anesthesia: General/ET   Estimated Blood Loss: 2 mL  Hysteroscopic Fluid deficit: 500 mL  Specimens:   ID Type Source Tests Collected by Time Destination   1 : ENDOCERVICAL CURETTINGS Preservative Endocervical Curetting  Sheltonious MD Jodie 8/14/2019 1401 Pathology   2 : ENDOMETRIAL CURETTINGS Preservative Endometrial Curetting  Ketan Feliciano MD 8/14/2019 1408 Pathology   3 : Myosure Endometrial Curettings Preservative Endometrial  Ketan Feliciano MD 8/14/2019 1443 Pathology      Findings: diffuse thickening, bilateral fallopian tube os not seen well, multiple polyps throughout, uterus colapsing and not well distended (good minimal loss from the cervix), posterior wall submucosal fibroid  Complications: initially poor suction from the myosure, once clot pulled out good suction  Implants: * No implants in log *    Dictation confirmation number: [id number]

## 2019-08-14 NOTE — ANESTHESIA POSTPROCEDURE EVALUATION
Procedure(s): HYSTEROSCOPY, DILATATION AND CURETTAGE, WITH MYOSURE POLYPECTOMY (CHOICE ANES).     general    Anesthesia Post Evaluation      Multimodal analgesia: multimodal analgesia used between 6 hours prior to anesthesia start to PACU discharge  Patient location during evaluation: bedside  Patient participation: complete - patient participated  Level of consciousness: awake and alert  Pain management: adequate  Airway patency: patent  Anesthetic complications: no  Cardiovascular status: acceptable  Respiratory status: acceptable  Hydration status: acceptable  Post anesthesia nausea and vomiting:  none      Vitals Value Taken Time   /82 8/14/2019  3:30 PM   Temp 36.7 °C (98.1 °F) 8/14/2019  3:15 PM   Pulse 93 8/14/2019  3:30 PM   Resp 24 8/14/2019  3:30 PM   SpO2 93 % 8/14/2019  3:30 PM

## 2019-08-14 NOTE — ANESTHESIA PREPROCEDURE EVALUATION
Relevant Problems   No relevant active problems       Anesthetic History   No history of anesthetic complications            Review of Systems / Medical History  Patient summary reviewed, nursing notes reviewed and pertinent labs reviewed    Pulmonary          Undiagnosed apnea         Neuro/Psych         Psychiatric history ( depression)    Comments: Peripheral neuropathy Cardiovascular    Hypertension        Dysrhythmias : sinus tachycardia      Exercise tolerance: <4 METS  Comments: POTS; controlled with BB    Cardiologist cleared pt for procedure     06/19 EKG= SR    GI/Hepatic/Renal     GERD: well controlled           Endo/Other    Diabetes (insulin pump): type 2    Morbid obesity     Other Findings            Physical Exam    Airway  Mallampati: IV  TM Distance: > 6 cm  Neck ROM: normal range of motion   Mouth opening: Normal     Cardiovascular    Rhythm: regular  Rate: normal         Dental  No notable dental hx       Pulmonary  Breath sounds clear to auscultation               Abdominal  GI exam deferred       Other Findings            Anesthetic Plan    ASA: 3  Anesthesia type: general          Induction: Intravenous  Anesthetic plan and risks discussed with: Patient      preop glucose 188.  Took BB at 730 am

## 2019-08-14 NOTE — DISCHARGE INSTRUCTIONS
[address], [address], [address] [facility]    Outpatient Surgery Discharge Instructions      Sharlene Jonathan It is important that you take the medication exactly as they are prescribed.  Do not take other medications without consulting your doctor. Activity:   No lifting straining or exertional activities for 2 to 3 days.  You may take a shower tomorrow.  No driving for 2 to 3 days or while taking narcotics for pain.  Do not return to work for 2 to 4 days after procedure, if want/need to return to work sooner please call the [employer] office.  Nothing in vagina for 2 weeks      Recommended Diet: diabetic diet      Follow-Up Care:   2 weeks with Dr. Juanita Neely as scheduled. [phone]    Additional Information:  If you experience any of the following symptoms then please call me or return to the emergency room if you cannot contact your doctor:   Increased vaginal bleeding   Fever   Chills   Nausea   Vomiting   Diarrhea   Change in mentation   Falling   Bleeding   Shortness of breath    DO NOT TAKE TYLENOL/ACETAMINOPHEN WITH PERCOCET, [address], [address] [address] [facility]. TAKE NARCOTIC PAIN MEDICATIONS WITH FOOD     Narcotics tend to be constipating, we suggest taking a stool softener such as Colace or Miralax (follow package instructions). DO NOT DRIVE WHILE TAKING NARCOTIC PAIN MEDICATIONS. DO NOT TAKE SLEEPING MEDICATIONS OR ANTIANXIETY MEDICATIONS WHILE TAKING NARCOTIC PAIN MEDICATIONS,  ESPECIALLY THE NIGHT OF ANESTHESIA! CPAP PATIENTS BE SURE TO WEAR MACHINE WHENEVER NAPPING OR SLEEPING! DISCHARGE SUMMARY from Nurse    The following personal items collected during your admission are returned to you:   Dental Appliance: Dental Appliances: None  Vision: Visual Aid: None  Hearing Aid:    Jewelry: Jewelry: None  Clothing: Clothing: With patient, Footwear, Socks, Other (comment), Undergarments(dress)  Other Valuables: Other Valuables:  Other (comment), Home Medical Equipment(comment), Purse(insulin pump with pt; purse given to mom)  Valuables sent to safe:        PATIENT INSTRUCTIONS:    After General Anesthesia or Intravenous Sedation, for 24 hours or while taking prescription Narcotics:        Someone should be with you for the next 24 hours. For your own safety, a responsible adult must drive you home. · Limit your activities  · Recommended activity: Rest today, up with assistance today. Do not climb stairs or shower unattended for the next 24 hours. · Please start with a soft bland diet and advance as tolerated (no nausea) to regular diet. · If you have a sore throat you should try the following: fluids, warm salt water gargles, or throat lozenges. If it does not improve after several days please follow up with your primary physician. · Do not drive and operate hazardous machinery  · Do not make important personal or business decisions  · Do  not drink alcoholic beverages  · If you have not urinated within 8 hours after discharge, please contact your surgeon on call. Report the following to your surgeon:  · Excessive pain, swelling, redness or odor of or around the surgical area  · Temperature over 100.5  · Nausea and vomiting lasting longer than 4 hours or if unable to take medications  · Any signs of decreased circulation or nerve impairment to extremity: change in color, persistent  numbness, tingling, coldness or increase pain      · You will receive a Post Operative Call from one of the Recovery Room Nurses on the day after your surgery to check on you. It is very important for us to know how you are recovering after your surgery. If you have an issue or need to speak with someone, please call your surgeon, do not wait for the post operative call. · You may receive an e-mail or letter in the mail from [facility] regarding your experience with us in the Ambulatory Surgery Unit.  Your feedback is valuable to us and we appreciate your participation in the survey. · If the above instructions are not adequate or you are having problems after your surgery, call the physician at their office number. · We wish you a speedy recovery ? What to do at Home:      *  Please give a list of your current medications to your Primary Care Provider. *  Please update this list whenever your medications are discontinued, doses are      changed, or new medications (including over-the-counter products) are added. *  Please carry medication information at all times in case of emergency situations. If you have not received your influenza and/or pneumococcal vaccine, please follow up with your primary care physician. The discharge information has been reviewed with the patient and caregiver. The patient and caregiver verbalized understanding. TO PREVENT AN INFECTION      1. [address] YOUR HANDS     To prevent infection, good handwashing is the most important thing you or your caregiver can do.  Wash your hands with soap and water or use the hand  we gave you before you touch any wounds. 2. SHOWER     Use the antibacterial soap we gave you when you take a shower.  Shower with this soap until your wounds are healed.  To reach all areas of your body, you may need someone to help you.  Dont forget to clean your belly button with every shower. 3.  USE CLEAN SHEETS     Use freshly cleaned sheets on your bed after surgery.  To keep the surgery site clean, do not allow pets to sleep with you while your wound is still healing. 4. STOP SMOKING     Stop smoking, or at least cut back on smoking     Smoking slows your healing. 5.  CONTROL YOUR BLOOD SUGAR     High blood sugars slow wound healing.  If you are diabetic, control your blood sugar levels before and after your surgery.

## 2019-08-15 NOTE — OP NOTES
[address]  OPERATIVE REPORT    Name:  Mikala Campbell  MR#:  [id number]  :  1980  ACCOUNT #:  [de-identified]  DATE OF SERVICE:  2019    PREOPERATIVE DIAGNOSES:  Menorrhagia, simple endometrial hyperplasia, uterine fibroids. POSTOPERATIVE DIAGNOSES:  Menorrhagia, simple endometrial hyperplasia, uterine fibroids, endometrial polyps, posterior wall uterine fibroid, thickened endometrium. PROCEDURE PERFORMED:  Hysteroscopy, dilation and curettage, MyoSure polypectomy, MyoSure endometrial sampling. SURGEON:  Deandra Perkins MD    ANESTHESIA:  General/ET. COMPLICATIONS:  none. SPECIMENS REMOVED:  Endocervial curettings; endometrial curettings, MyoSure endometrial curettings. DRAINS:  None. IMPLANTS:  None. ESTIMATED BLOOD LOSS:  2 mL. HYSTEROSCOPIC FLUID DEFICIT:  500 mL. SURGICAL TALK:  At the office at our preop visit, we discussed the risks of surgery including infection, bleeding, damage to bowel, bladder, and adjacent organs secondary to uterine perforation. We discussed the rare risk of death, we discussed the risk of nerve injury secondary to leg placement. We discussed that fibroids will not be removed and that we tried to minimize the procedure as per her PCP, and mostly we are trying to get as much tissue for analysis. FINDINGS:  Uterus sounded to 10 cm, fallopian tubes and os were difficult to visualize due to multiple polyps and bleeding throughout the cavity, anterior wall that was irregular, thickened polypoid structures. PROCEDURE:  The patient was taken to the operating room and placed on the operating room table. General anesthesia was administered and endotracheal tube was placed. Carefully, she was put in the dorsal lithotomy position utilizing Yellofin leg holders, where she was prepped with Betadine vaginally, vulvar and perineally and draped in normal fashion for this procedure. A time-out was performed and all in agreement. All questions were answered. Exam under anesthesia found the uterus to be anteverted. A single-sided speculum was placed. Cervix was grasped with single-tooth tenaculum. Cervix was dilated to a 6-Hegar dilator and endocervical curettage was performed with Kevorkian curette. Uterus dilated to a 7 Hegar dilator and sounded to 10 cm. Hysteroscope was inserted and endometrium visualized with the above noted findings. The MyoSure was placed and I began to perform polypectomy and endometrial sampling. We had to remove the scope a number of times to remove clots and to get the suction working better, but ultimately a large amount of the endometrial tissue removed and sharp curettage was performed due to bleeding and trying to remove the tissue that was still there. We looked with the hysteroscope and more sampling was performed with the MyoSure device and at this point vision was being significantly obscured by bleeding mixed with the hysteroscopic fluid and the case was ended. Uterus was resounded, no signs of perforation noted and anesthesia was reversed. All tenaculums and retractors removed, undraped and cleansed, carefully taken out of the dorsal lithotomy position, ultimately taken to PACU in stable condition. Needle, sponge, and instrument counts were correct x2.       Cynthia Heath MD      [id number]  D:  08/14/2019 15:07  T:  08/14/2019 22:03  JOB #:  [id number]  CC:  Oswaldo Rendon MD

## 2019-09-08 NOTE — PROGRESS NOTES
I saw patient prior to procedure today. She is doing well without complaint, no change in PMH, PSH, Allergies, Medications, ROS or exam.  She is in agreement with planned procedure today. Questions answered. normal... well appearing, well nourished, and in no apparent distress.

## 2021-03-18 ENCOUNTER — OFFICE VISIT (OUTPATIENT)
Dept: FAMILY MEDICINE CLINIC | Age: 41
End: 2021-03-18
Payer: MEDICAID

## 2021-03-18 ENCOUNTER — TELEPHONE (OUTPATIENT)
Dept: FAMILY MEDICINE CLINIC | Age: 41
End: 2021-03-18

## 2021-03-18 DIAGNOSIS — R21 RASH: Primary | ICD-10-CM

## 2021-03-18 PROCEDURE — 99212 OFFICE O/P EST SF 10 MIN: CPT | Performed by: NURSE PRACTITIONER

## 2021-03-18 RX ORDER — FLUCONAZOLE 150 MG/1
150 TABLET ORAL DAILY
Qty: 1 TAB | Refills: 0 | Status: SHIPPED | OUTPATIENT
Start: 2021-03-18 | End: 2021-03-18

## 2021-03-18 RX ORDER — ULTRAMICROSIZE GRISEOFULVIN 125 MG/1
125 TABLET ORAL DAILY
Qty: 14 TAB | Refills: 0 | Status: SHIPPED | OUTPATIENT
Start: 2021-03-18 | End: 2021-04-01

## 2021-03-18 NOTE — TELEPHONE ENCOUNTER
----- Message from [facility] sent at 3/18/2021  1:43 PM EDT -----  Regarding: ANTHONY Heath/telephone  Medication Refill    Caller (if not patient): N/A      Relationship of caller (if not patient): N/A      Best contact number(s): [phone]      Name of medication and dosage if known: \"Micomazole\"       Is patient out of this medication (yes/no): almost      Pharmacy name: [facility]    Pharmacy listed in chart? (yes/no): Y  Pharmacy phone number: [phone]      Details to clarify the request: Patient stated that she forgot to ask the doctor when she was there today if she should continue using the yeast infection cream, if she should she would need more sen too her pharmacy.       Olga Gautam

## 2021-03-18 NOTE — TELEPHONE ENCOUNTER
----- Message from Laney Day sent at 3/18/2021  9:34 AM EDT -----  Regarding: /Telephone    General Message/Vendor Calls    Caller's first and last name: Self      Reason for call: Pt needs to know if okay to just wear face shield in office visit      Callback required yes/no and why: Yes to assist.       Best contact number(s): [phone]      Details to clarify the request: Pt is scheduled to come in today at 11:50am and only wears a facesheild due to she can not breath wearing a mask. Please call pt if this is an issue. Pt did past covid screening.        Laney Day

## 2021-03-19 NOTE — PROGRESS NOTES
Subjective:     Jeanna Saavedra is a 38 y.o. female who presents today with the following:  No chief complaint on file. Patient Active Problem List   Diagnosis Code    Obesity, morbid ([facility] E66.01    Diabetes ([facility] [facility] E11.9    HTN (hypertension) I10    Hyperlipemia E78.5    Peripheral neuropathy G62.9    Morbid obesity ([facility] [address] E66.01    GERD (gastroesophageal reflux disease) K21.9    Elevated triglycerides with high cholesterol E78.2    Abscess L02.91    Type 2 diabetes mellitus with diabetic neuropathy (HCC) E11.40    Menorrhagia N92.0    Simple endometrial hyperplasia N85.01         COMPLIANT WITH MEDICATION:    Denies chest pain, dyspnea, palpitations, headache and blurred vision. Blood pressure normotensive. Rash/ candidasis; persistent for months- trying both OTC and rx topical and oral remedies . Failed Lotrimin, Nystatin , gingin violet  And unable to take diflucan due to serious possibly life threatening drug interaction with in dawn . depression screening addressed not at risk    abuse screening addressed denies    learning assessment addressed reviewed nurses notes    fall risk addressed not at risk    HM: addressed not addressed at this visit. ROS:  Gen: denies fever, chills, fatigue, weight loss, weight gain  HEENT:denies blurry vision, nasal congestion, sore throat  Resp: denies dypsnea, cough, wheezing  CV: denies chest pain radiating to the jaws or arms, palpitations, lower extremity edema  Abd: denies nausea, vomiting, diarrhea, constipation  Neuro: denies numbness/tingling  Endo: denies polyuria, polydipsia, heat/cold intolerance  Heme: no lymphadenopathy    Allergies   Allergen Reactions    Latex Rash    Lidocaine Rash and Swelling    Advair Diskus [Fluticasone Propion-Salmeterol] Rash    Pcn [Penicillins] Unknown (comments)     Childhood, unknown reaction    Soap Rash     Pt states she is unable to use any soap.   Causes rash    Sulfa (Sulfonamide Antibiotics) Rash and Swelling         Current Outpatient Medications:     griseofulvin (RICARDO-PEG) 125 mg tablet, Take 1 Tab by mouth daily for 14 days. , Disp: 14 Tab, Rfl: 0    meloxicam (MOBIC) 7.5 mg tablet, TAKE 2 TABLETS BY MOUTH DAILY. INDICATIONS: JOINT DAMAGE CAUSING PAIN AND LOSS OF FUNCTION, Disp: 60 Tab, Rfl: 2    fenofibrate (LOFIBRA) 160 mg tablet, TAKE 1 TABLET BY MOUTH EVERY DAY, Disp: 30 Tab, Rfl: 3    omeprazole (PRILOSEC) 40 mg capsule, TAKE 1 CAPSULE BY MOUTH EVERY DAY, Disp: 30 Cap, Rfl: 5    clotrimazole (LOTRIMIN) 1 % topical cream, Apply  to affected area two (2) times daily as needed for Skin Irritation (rash). , Disp: 45 g, Rfl: 5    nystatin (MYCOSTATIN) 100,000 unit/mL suspension, TAKE 2ML BY MOUTH 4 TIMES A DAY (SWISH & SPIT)  Indications: thrush, Disp: 240 mL, Rfl: 11    miconazole nitrate 200 mg/5 gram (4 %) vaginal cream, Insert  into vagina nightly as needed (vaginal /labial itching). May be applied directly to labia, Disp: 1 Tube, Rfl: 3    NovoLOG U-100 Insulin aspart 100 unit/mL injection, , Disp: , Rfl:     zinc oxide 40 % oint ointment, Apply  to affected area as needed for Skin Irritation. , Disp: 1 Tube, Rfl: 5    Insulin Needles, Disposable, (ERYN PEN NEEDLE) 32 gauge x 5/32\" ndle, BD Ultra-Fine Eryn Pen Needle 32 gauge x 5/32\"  USE 1 NEEDLE SUBCUTANEOUSLY ONCE A DAY USE WITH VICTOZA PEN, Disp: , Rfl:     Insulin Needles, Disposable, (ERYN PEN NEEDLE) 32 gauge x 5/32\" ndle, BD Ultra-Fine Eryn Pen Needle 32 gauge x 5/32\", Disp: , Rfl:     glucose blood VI test strips (CONTOUR NEXT TEST STRIPS) strip, Contour Next Test Strips, Disp: , Rfl:     cyanocobalamin 1,000 mcg tablet, Take 1,000 mcg by mouth daily. , Disp: , Rfl:     potassium chloride SR (KLOR-CON 8) 8 mEq tablet, TAKE 1 TABLET BY MOUTH TWICE A DAY, Disp: 60 Tab, Rfl: 2    valACYclovir (VALTREX) 500 mg tablet, Take 1 Tab by mouth two (2) times a day., Disp: , Rfl:     potassium chloride SR (KLOR-CON 8) 8 mEq tablet, Take 1 Tab by mouth two (2) times a day., Disp: , Rfl:     VICTOZA 3-VICKEY 0.6 mg/0.1 mL (18 mg/3 mL) pnij, INJECT 1.8 MG UNDER THE SKIN ONCE DAILY AS DIRECTED, Disp: , Rfl: 1    metFORMIN ER (GLUCOPHAGE XR) 500 mg tablet, Take 500 mg by mouth two (2) times a day., Disp: , Rfl: 3    megestrol (MEGACE) 20 mg tablet, Take 20 mg by mouth two (2) times a day., Disp: , Rfl:     furosemide (LASIX) 20 mg tablet, Take one daily as needed for lower extremity edema. Indications: visible water retention (Patient taking differently: daily. Take one daily as needed for lower extremity edema. not a current medication  Indications: visible water retention), Disp: 90 Tab, Rfl: 1    pregabalin (LYRICA) 75 mg capsule, Take 1 Cap by mouth three (3) times daily. Max Daily Amount: 225 mg. Indications: Diabetic Complication causing Injury to some Body Nerves, Disp: 90 Cap, Rfl: 1    paliperidone palmitate (INVEGA SUSTENNA) 78 mg/0.5 mL injection, by IntraMUSCular route once. Extended release   Indications: depression, Disp: , Rfl:     metoprolol tartrate (LOPRESSOR) 50 mg tablet, Take 1 Tab by mouth two (2) times a day., Disp: 180 Tab, Rfl: 1    ergocalciferol (ERGOCALCIFEROL) 50,000 unit capsule, Take 1 Cap by mouth every seven (7) days. Indications: Vitamin D Deficiency (High Dose Therapy), Disp: 15 Cap, Rfl: 1    rosuvastatin (CRESTOR) 10 mg tablet, Take 1 Tab by mouth nightly., Disp: 90 Tab, Rfl: 1    insulin CONCENTRATED regular (HUMULIN R U-500) 500 unit/mL soln, 1.6 Units by SubCUTAneous route continuous. Via pump   Indications: Diabetes Mellitus with Severe Insulin Resistance (Patient taking differently: 1.595 Units by SubCUTAneous route continuous.  Via pump   not a current medication  Indications: diabetes mellitus with severe insulin resistance), Disp: 20 mL, Rfl: 11    Past Medical History:   Diagnosis Date    Adverse effect of anesthesia     slow to awaken    At risk for sleep apnea 08/07/2019    STOP BANG 5, pt states that she refuses to see a sleep specialist, has previously been referred.  Depression     Diabetes ([address]     on insulin pump    DAVIS (dyspnea on exertion)     8/7/19 unchanged for 3 years per pt    Dysmenorrhea     Elevated triglycerides with high cholesterol     GERD (gastroesophageal reflux disease)     Hyperlipemia     Inappropriate sinus tachycardia     per 2/19/19 prior cardio notes \"inappropriate sinus tacycardia vs POTS. improving, continue current dose of metoprolol\"    Morbid obesity ([address]     Orthostatic hypotension     Peripheral neuropathy     Tachycardia     Vitamin D deficiency        Past Surgical History:   Procedure Laterality Date    HX ENDOSCOPY      with dilation    HX LAP CHOLECYSTECTOMY      HX SEPTOPLASTY      HX TONSIL AND ADENOIDECTOMY         Social History     Tobacco Use   Smoking Status Never Smoker   Smokeless Tobacco Never Used       Social History     Socioeconomic History    Marital status: SINGLE     Spouse name: Not on file    Number of children: Not on file    Years of education: Not on file    Highest education level: Not on file   Tobacco Use    Smoking status: Never Smoker    Smokeless tobacco: Never Used   Substance and Sexual Activity    Alcohol use: Never     Frequency: Never    Drug use: Never       Family History   Problem Relation Age of Onset    Diabetes Mother     Hypertension Father     Diabetes Father     Cancer Maternal Grandmother          Objective:   Declined vital and rooming questions    RASH ; under right breast itchy, red, circular rash. . Red [place] surrounded with inner clear patch of skin.   Results for orders placed or performed in visit on 84/22/38   METABOLIC PANEL, COMPREHENSIVE   Result Value Ref Range    Glucose 130 (H) 65 - 99 mg/dL    BUN 12 6 - 24 mg/dL    Creatinine 1.05 (H) 0.57 - 1.00 mg/dL    GFR est non-AA 66 >59 mL/min/1.73    GFR est AA 76 >59 mL/min/1.73    BUN/Creatinine ratio 11 9 - 23    Sodium 144 134 - 144 mmol/L    Potassium 4.6 3.5 - 5.2 mmol/L    Chloride 105 96 - 106 mmol/L    CO2 23 20 - 29 mmol/L    Calcium 9.8 8.7 - 10.2 mg/dL    Protein, total 7.2 6.0 - 8.5 g/dL    Albumin 4.6 3.8 - 4.8 g/dL    GLOBULIN, TOTAL 2.6 1.5 - 4.5 g/dL    A-G Ratio 1.8 1.2 - 2.2    Bilirubin, total 0.2 0.0 - 1.2 mg/dL    Alk. phosphatase 59 39 - 117 IU/L    AST (SGOT) 12 0 - 40 IU/L    ALT (SGPT) 14 0 - 32 IU/L   LIPID PANEL   Result Value Ref Range    Cholesterol, total 106 100 - 199 mg/dL    Triglyceride 109 0 - 149 mg/dL    HDL Cholesterol 30 (L) >39 mg/dL    VLDL, calculated 20 5 - 40 mg/dL    LDL, calculated 56 0 - 99 mg/dL   HEMOGLOBIN A1C WITH EAG   Result Value Ref Range    Hemoglobin A1c 7.5 (H) 4.8 - 5.6 %    Estimated average glucose 169 mg/dL   CBC WITH AUTOMATED DIFF   Result Value Ref Range    WBC 9.4 3.4 - 10.8 x10E3/uL    RBC 5.50 (H) 3.77 - 5.28 x10E6/uL    HGB 14.0 11.1 - 15.9 g/dL    HCT 45.3 34.0 - 46.6 %    MCV 82 79 - 97 fL    MCH 25.5 (L) 26.6 - 33.0 pg    MCHC 30.9 (L) 31.5 - 35.7 g/dL    RDW 13.9 11.7 - 15.4 %    PLATELET 636 056 - 825 x10E3/uL    NEUTROPHILS 61 Not Estab. %    Lymphocytes 30 Not Estab. %    MONOCYTES 5 Not Estab. %    EOSINOPHILS 3 Not Estab. %    BASOPHILS 1 Not Estab. %    ABS. NEUTROPHILS 5.7 1.4 - 7.0 x10E3/uL    Abs Lymphocytes 2.8 0.7 - 3.1 x10E3/uL    ABS. MONOCYTES 0.5 0.1 - 0.9 x10E3/uL    ABS. EOSINOPHILS 0.3 0.0 - 0.4 x10E3/uL    ABS. BASOPHILS 0.1 0.0 - 0.2 x10E3/uL    IMMATURE GRANULOCYTES 0 Not Estab. %    ABS. IMM. GRANS. 0.0 0.0 - 0.1 x10E3/uL   AMB POC URINE, MICROALBUMIN, SEMIQUANT (3 RESULTS)   Result Value Ref Range    ALBUMIN, URINE POC 10 Negative mg/L    CREATININE, URINE  mg/dL    Microalbumin/creat ratio (POC) <30 <30 MG/G       No results found for this visit on 03/18/21. Assessment/ Plan:     1. Rash  Orders Placed This Encounter    DISCONTD: fluconazole (DIFLUCAN) 150 mg tablet     Sig: Take 1 Tab by mouth daily for 1 day.      Dispense:  1 Tab Refill:  0    griseofulvin (RICARDO-PEG) 125 mg tablet     Sig: Take 1 Tab by mouth daily for 14 days. Dispense:  14 Tab     Refill:  0         Orders Placed This Encounter    DISCONTD: fluconazole (DIFLUCAN) 150 mg tablet     Sig: Take 1 Tab by mouth daily for 1 day. Dispense:  1 Tab     Refill:  0    griseofulvin (RICARDO-PEG) 125 mg tablet     Sig: Take 1 Tab by mouth daily for 14 days. Dispense:  14 Tab     Refill:  0         Verbal and written instructions (see AVS) provided. Patient expresses understanding of diagnosis and treatment plan.     Health Maintenance Due   Topic Date Due    Hepatitis C Screening  Never done    Pneumococcal 0-64 years (1 of 1 - PPSV23) Never done    Eye Exam Retinal or Dilated  Never done    COVID-19 Vaccine (1) Never done    DTaP/Tdap/Td series (1 - Tdap) Never done    PAP AKA CERVICAL CYTOLOGY  Never done    Flu Vaccine (1) Never done    Foot Exam Q1  11/18/2020               GEORGIE Feliz

## 2021-08-03 PROBLEM — E66.01 OBESITY, MORBID (HCC): Status: RESOLVED | Noted: 2019-05-16 | Resolved: 2021-08-03

## 2021-09-22 LAB — HEMOGLOBIN A1C: 6.8 %

## 2022-02-23 LAB — HBA1C MFR BLD HPLC: 6.3 %

## 2022-03-19 PROBLEM — E11.40 TYPE 2 DIABETES MELLITUS WITH DIABETIC NEUROPATHY (HCC): Status: ACTIVE | Noted: 2019-08-07

## 2022-03-19 PROBLEM — N92.0 MENORRHAGIA: Status: ACTIVE | Noted: 2019-08-13

## 2022-03-19 PROBLEM — L02.91 ABSCESS: Status: ACTIVE | Noted: 2019-07-05

## 2022-03-20 PROBLEM — N85.01 SIMPLE ENDOMETRIAL HYPERPLASIA: Status: ACTIVE | Noted: 2019-08-13

## 2022-05-26 LAB — HBA1C MFR BLD HPLC: 6.7 %

## 2022-09-12 ENCOUNTER — OFFICE VISIT (OUTPATIENT)
Dept: FAMILY MEDICINE CLINIC | Age: 42
End: 2022-09-12
Payer: MEDICAID

## 2022-09-12 VITALS
OXYGEN SATURATION: 98 % | HEART RATE: 88 BPM | HEIGHT: 66 IN | SYSTOLIC BLOOD PRESSURE: 128 MMHG | TEMPERATURE: 97.1 F | WEIGHT: 293 LBS | BODY MASS INDEX: 47.09 KG/M2 | RESPIRATION RATE: 20 BRPM | DIASTOLIC BLOOD PRESSURE: 70 MMHG

## 2022-09-12 DIAGNOSIS — E08.65 DIABETES MELLITUS DUE TO UNDERLYING CONDITION WITH HYPERGLYCEMIA, WITH LONG-TERM CURRENT USE OF INSULIN (HCC): Primary | ICD-10-CM

## 2022-09-12 DIAGNOSIS — Z79.4 DIABETES MELLITUS DUE TO UNDERLYING CONDITION WITH HYPERGLYCEMIA, WITH LONG-TERM CURRENT USE OF INSULIN (HCC): Primary | ICD-10-CM

## 2022-09-12 DIAGNOSIS — R42 VERTIGO: ICD-10-CM

## 2022-09-12 PROCEDURE — 99214 OFFICE O/P EST MOD 30 MIN: CPT | Performed by: NURSE PRACTITIONER

## 2022-09-12 RX ORDER — MECLIZINE HCL 12.5 MG 12.5 MG/1
12.5 TABLET ORAL
Qty: 30 TABLET | Refills: 2 | Status: SHIPPED | OUTPATIENT
Start: 2022-09-12 | End: 2022-10-12

## 2022-09-12 RX ORDER — OMEPRAZOLE 40 MG/1
40 CAPSULE, DELAYED RELEASE ORAL DAILY
Qty: 30 CAPSULE | Refills: 5 | Status: SHIPPED | OUTPATIENT
Start: 2022-09-12

## 2022-09-12 RX ORDER — OFLOXACIN 3 MG/ML
5 SOLUTION AURICULAR (OTIC) DAILY
Qty: 5 ML | Refills: 0 | Status: SHIPPED | OUTPATIENT
Start: 2022-09-12 | End: 2022-09-22

## 2022-09-12 RX ORDER — LANOLIN ALCOHOL/MO/W.PET/CERES
1000 CREAM (GRAM) TOPICAL
COMMUNITY

## 2022-09-12 NOTE — PROGRESS NOTES
1. \"Have you been to the ER, urgent care clinic since your last visit? No Hospitalized since your last visit? \" No    2. \"Have you seen or consulted any other health care providers outside of the [address] [place] [address] since your last visit? \" No     3. For patients aged 39-70: Has the patient had a colonoscopy / FIT/ Cologuard? NA - based on age      If the patient is female:    4. For patients aged 41-77: Has the patient had a mammogram within the past 2 years? No      5. For patients aged 21-65: Has the patient had a pap smear?  No  Chief Complaint   Patient presents with    Medication Refill    Ear Pain     Both, itchy and painful, problem with hearing    Dizziness     Off an on

## 2022-09-18 NOTE — ACP (ADVANCE CARE PLANNING)
Discussed importance of advanced medical directives with patient. Patient is capable of making decisions.  Bradly Smith NP-C

## 2022-09-18 NOTE — PROGRESS NOTES
Subjective:     Rob Truong is a 36 y.o. female who presents today with the following:  Chief Complaint   Patient presents with    Medication Refill    Ear Pain     Both, itchy and painful, problem with hearing    Dizziness     Off an on        Patient Active Problem List   Diagnosis Code    Diabetes ([facility] E11.9    HTN (hypertension) I10    Hyperlipemia E78.5    Peripheral neuropathy G62.9    Morbid obesity ([facility] E66.01    GERD (gastroesophageal reflux disease) K21.9    Elevated triglycerides with high cholesterol E78.2    Abscess L02.91    Type 2 diabetes mellitus with diabetic neuropathy ([facility] E11.40    Menorrhagia N92.0    Simple endometrial hyperplasia N85.01         COMPLIANT WITH MEDICATION:   HTN; Denies chest pain, dyspnea, palpitations, headache and blurred vision. Blood pressure normotensive. Otalgia ; ear fell itchy and painful. She endorses she is constantly digging and scratching her ears. Vertigo; room spinning     depression screening addressed not at risk    abuse screening addressed denies    learning assessment addressed reviewed nurses notes    fall risk addressed not at risk    HM: addressed please re    ROS:  Gen: denies fever, chills, fatigue, weight loss, weight gain  HEENT:denies blurry vision, nasal congestion, sore throat  Resp: denies dypsnea, cough, wheezing  CV: denies chest pain radiating to the jaws or arms, palpitations, lower extremity edema  Abd: denies nausea, vomiting, diarrhea, constipation  Neuro: denies numbness/tingling  Endo: denies polyuria, polydipsia, heat/cold intolerance  Heme: no lymphadenopathy    Allergies   Allergen Reactions    Latex Rash    Lidocaine Rash and Swelling    Advair Diskus [Fluticasone Propion-Salmeterol] Rash    Pcn [Penicillins] Unknown (comments)     Childhood, unknown reaction    Soap Rash     Pt states she is unable to use any soap.   Causes rash    Sulfa (Sulfonamide Antibiotics) Rash and Swelling    Zinc Acetate Rash     Hives and Rash Current Outpatient Medications:     cyanocobalamin 1,000 mcg tablet, Take 1,000 mcg by mouth., Disp: , Rfl:     meclizine (ANTIVERT) 12.5 mg tablet, Take 1 Tablet by mouth three (3) times daily as needed for Dizziness for up to 30 days. Indications: sensation of spinning or whirling, Disp: 30 Tablet, Rfl: 2    omeprazole (PRILOSEC) 40 mg capsule, Take 1 Capsule by mouth daily. , Disp: 30 Capsule, Rfl: 5    ofloxacin (FLOXIN) 0.3 % otic solution, Administer 5 Drops into each ear daily for 10 days. Indications: outer ear inflammation due to allergy or infection, Disp: 5 mL, Rfl: 0    nystatin (MYCOSTATIN) 100,000 unit/mL suspension, TAKE 2ML BY MOUTH 4 TIMES A DAY (SWISH & SPIT) INDICATIONS: THRUSH, Disp: 240 mL, Rfl: 11    fenofibrate (LOFIBRA) 160 mg tablet, TAKE 1 TABLET BY MOUTH EVERY DAY, Disp: 30 Tab, Rfl: 3    Insulin Needles, Disposable, 32 gauge x 5/32\" ndle, BD Ultra-Fine Belia Pen Needle 32 gauge x 5/32\"  USE 1 NEEDLE SUBCUTANEOUSLY ONCE A DAY USE WITH VICTOZA PEN, Disp: , Rfl:     Insulin Needles, Disposable, 32 gauge x 5/32\" ndle, BD Ultra-Fine Belia Pen Needle 32 gauge x 5/32\", Disp: , Rfl:     VICTOZA 3-VICKEY 0.6 mg/0.1 mL (18 mg/3 mL) pnij, INJECT 1.8 MG UNDER THE SKIN ONCE DAILY AS DIRECTED, Disp: , Rfl: 1    metFORMIN ER (GLUCOPHAGE XR) 500 mg tablet, Take 500 mg by mouth two (2) times a day., Disp: , Rfl: 3    metoprolol tartrate (LOPRESSOR) 50 mg tablet, Take 1 Tab by mouth two (2) times a day., Disp: 180 Tab, Rfl: 1    ergocalciferol (ERGOCALCIFEROL) 50,000 unit capsule, Take 1 Cap by mouth every seven (7) days. Indications: Vitamin D Deficiency (High Dose Therapy), Disp: 15 Cap, Rfl: 1    rosuvastatin (CRESTOR) 10 mg tablet, Take 1 Tab by mouth nightly., Disp: 90 Tab, Rfl: 1    insulin CONCENTRATED regular (HUMULIN R U-500) 500 unit/mL soln, 1.6 Units by SubCUTAneous route continuous.  Via pump   Indications: Diabetes Mellitus with Severe Insulin Resistance (Patient taking differently: 1.595 Units by SubCUTAneous route continuous. Via pump   not a current medication  Indications: diabetes mellitus with severe insulin resistance), Disp: 20 mL, Rfl: 11    Past Medical History:   Diagnosis Date    Adverse effect of anesthesia     slow to awaken    At risk for sleep apnea 08/07/2019    STOP BANG 5, pt states that she refuses to see a sleep specialist, has previously been referred. Depression     Diabetes ([address]     on insulin pump    DAVIS (dyspnea on exertion)     8/7/19 unchanged for 3 years per pt    Dysmenorrhea     Elevated triglycerides with high cholesterol     GERD (gastroesophageal reflux disease)     Hyperlipemia     Inappropriate sinus tachycardia     per 2/19/19 prior cardio notes \"inappropriate sinus tacycardia vs POTS. improving, continue current dose of metoprolol\"    Morbid obesity (HCC)     Orthostatic hypotension     Peripheral neuropathy     Tachycardia     Vitamin D deficiency        Past Surgical History:   Procedure Laterality Date    HX ENDOSCOPY      with dilation    HX LAP CHOLECYSTECTOMY      HX SEPTOPLASTY      HX TONSIL AND ADENOIDECTOMY         Social History     Tobacco Use   Smoking Status Never   Smokeless Tobacco Never       Social History     Socioeconomic History    Marital status: SINGLE   Tobacco Use    Smoking status: Never    Smokeless tobacco: Never   Substance and Sexual Activity    Alcohol use: Never    Drug use: Never       Family History   Problem Relation Age of Onset    Heart Disease Mother     Diabetes Mother     Hypertension Father     Diabetes Father     Cancer Maternal Grandmother          Objective:     Visit Vitals  /70 (BP 1 Location: Right arm, BP Patient Position: Sitting, BP Cuff Size: Large adult)   Pulse 88   Temp 97.1 °F (36.2 °C) (Temporal)   Resp 20   Ht 5' 5.75\" (1.67 m)   Wt 320 lb 12.8 oz (145.5 kg)   SpO2 98%   BMI 52.17 kg/m²     Body mass index is 52.17 kg/m². General: Alert and oriented. No acute distress.   Well nourished,obese  HEENT :  Ears: Bilateral ear tenderness to touch no erythema ,edema or exudate noted. TMs are normal. Canals are clear. Eyes: pupils equal, round, react to light and accommodation. Extra ocular movements intact. Nose: patent. Mouth and throat is clear. Neck:supple full range of motion no thyromegaly. Trachea midline, No carotid bruits. No significant lymphadenopathy  Lungs[de-identified] clear to auscultation without wheezes, rales, or rhonchi. Heart :RRR, S1 & S2 are normal intensity. No murmur; no gallop  Abdomen: bowel sounds active. No tenderness, guarding, rebound, masses, hepatic or spleen enlargement  Back: no CVA tenderness. Extremities: without clubbing, cyanosis, or edema  Pulses: radial and femoral pulses are normal  Neuro: HMF intact. Cranial nerves II through XII grossly normal.  Motor: is 5 over 5 and symmetrical.   Deep tendon reflexes: +2 equal  Psych:appropriate behavior, mood, affect and judgement. Results for orders placed or performed in visit on 09/30/21   HEMOGLOBIN A1C   Result Value Ref Range    Hemoglobin A1c 6.8 %       No results found for this visit on 09/12/22. Assessment/ Plan:     1. Diabetes mellitus due to underlying condition with hyperglycemia, with long-term current use of insulin ([facility])  For diabetic foot care  - REFERRAL TO PODIATRY    2. Vertigo  Meclizine as ordered below      Orders Placed This Encounter    REFERRAL TO PODIATRY     Referral Priority:   Routine     Referral Type:   Consultation     Referral Reason:   Specialty Services Required     Referred to Provider:   Evie Early MD     Requested Specialty:   Podiatry    cyanocobalamin 1,000 mcg tablet     Sig: Take 1,000 mcg by mouth.    meclizine (ANTIVERT) 12.5 mg tablet     Sig: Take 1 Tablet by mouth three (3) times daily as needed for Dizziness for up to 30 days.  Indications: sensation of spinning or whirling     Dispense:  30 Tablet     Refill:  2    omeprazole (PRILOSEC) 40 mg capsule     Sig: Take 1 Capsule by mouth daily. Dispense:  30 Capsule     Refill:  5    ofloxacin (FLOXIN) 0.3 % otic solution     Sig: Administer 5 Drops into each ear daily for 10 days. Indications: outer ear inflammation due to allergy or infection     Dispense:  5 mL     Refill:  0         Verbal and written instructions (see AVS) provided. Patient expresses understanding of diagnosis and treatment plan.     Health Maintenance Due   Topic Date Due    COVID-19 Vaccine (1) Never done    Eye Exam Retinal or Dilated  Never done    DTaP/Tdap/Td series (1 - Tdap) Never done    Cervical cancer screen  Never done    Foot Exam Q1  11/18/2020    MICROALBUMIN Q1  12/15/2021    Lipid Screen  12/15/2021    Flu Vaccine (1) Never done    A1C test (Diabetic or Prediabetic)  09/22/2022               GEORGIE Little

## 2022-09-19 ENCOUNTER — TELEPHONE (OUTPATIENT)
Dept: FAMILY MEDICINE CLINIC | Age: 42
End: 2022-09-19

## 2022-09-19 NOTE — TELEPHONE ENCOUNTER
Left message for Chloé Manzo to give the office a call back. She needs to schedule an appt with Mikhail Xavier in 2 to 4 weeks for a follow up for vertigo.

## 2022-09-19 NOTE — TELEPHONE ENCOUNTER
----- Message from Cristofer Crockett NP sent at 9/18/2022 10:17 AM EDT -----  Please contact MS. Galloyon Trista to get notes and labs from her endocrinologist.  I do not have the provider's name. Thanks. She  has mental challenges to overcome to get informations. Thanks!  DL

## 2023-01-12 LAB — HEMOGLOBIN A1C: 6.6 %

## 2023-05-17 RX ORDER — FENOFIBRATE 160 MG/1
1 TABLET ORAL DAILY
COMMUNITY
Start: 2021-02-28

## 2023-05-17 RX ORDER — OMEPRAZOLE 40 MG/1
40 CAPSULE, DELAYED RELEASE ORAL DAILY
COMMUNITY
Start: 2022-09-12

## 2023-05-17 RX ORDER — ROSUVASTATIN CALCIUM 10 MG/1
10 TABLET, COATED ORAL
COMMUNITY
Start: 2019-05-16

## 2023-05-17 RX ORDER — METOPROLOL TARTRATE 50 MG/1
50 TABLET, FILM COATED ORAL 2 TIMES DAILY
COMMUNITY
Start: 2019-05-16

## 2023-05-17 RX ORDER — METFORMIN HYDROCHLORIDE 500 MG/1
500 TABLET, EXTENDED RELEASE ORAL 2 TIMES DAILY
COMMUNITY
Start: 2019-07-24

## 2023-05-17 RX ORDER — LIRAGLUTIDE 6 MG/ML
INJECTION SUBCUTANEOUS
COMMUNITY
Start: 2019-07-24

## 2023-05-17 RX ORDER — ERGOCALCIFEROL 1.25 MG/1
50000 CAPSULE ORAL
COMMUNITY
Start: 2019-05-16

## 2023-05-26 ENCOUNTER — HOSPITAL ENCOUNTER (OUTPATIENT)
Facility: HOSPITAL | Age: 43
Discharge: HOME | End: 2023-05-26
Payer: COMMERCIAL

## 2023-05-26 ENCOUNTER — APPOINTMENT (OUTPATIENT)
Facility: HOSPITAL | Age: 43
End: 2023-05-26
Payer: COMMERCIAL

## 2023-05-26 DIAGNOSIS — R10.2 PELVIC AND PERINEAL PAIN: ICD-10-CM

## 2023-05-26 PROCEDURE — 76857 US EXAM PELVIC LIMITED: CPT

## 2023-10-31 RX ORDER — OMEPRAZOLE 40 MG/1
CAPSULE, DELAYED RELEASE ORAL DAILY
Qty: 30 CAPSULE | Refills: 5 | OUTPATIENT
Start: 2023-10-31

## 2024-03-12 RX ORDER — OMEPRAZOLE 40 MG/1
CAPSULE, DELAYED RELEASE ORAL DAILY
Qty: 30 CAPSULE | Refills: 5 | OUTPATIENT
Start: 2024-03-12

## (undated) DEVICE — D&C/GYN-LF: Brand: MEDLINE INDUSTRIES, INC.

## (undated) DEVICE — TUBE ST FLD CTRL AQUILEX INFLO --

## (undated) DEVICE — SET SEALS HYSTEROSCOPE DISP -- MYOSURE  EA=10

## (undated) DEVICE — TUBING, SUCTION, 1/4" X 10', STRAIGHT: Brand: MEDLINE

## (undated) DEVICE — INFECTION CONTROL KIT SYS

## (undated) DEVICE — DEVICE TISS REMOVAL -- ORDER AS BX     APO CODE = DRP

## (undated) DEVICE — GOWN,SIRUS,FABRNF,XL,20/CS: Brand: MEDLINE

## (undated) DEVICE — TUBE ST FLD AQUILEX OUTFLO --

## (undated) DEVICE — NEEDLE SPNL 22GA L3.5IN BLK HUB S STL REG WALL FIT STYL W/

## (undated) DEVICE — SYR 10ML CTRL LR LCK NSAF LF --

## (undated) DEVICE — TOWEL SURG W17XL27IN STD BLU COT NONFENESTRATED PREWASHED

## (undated) DEVICE — SOLUTION LACTATED RINGERS INJECTION USP

## (undated) DEVICE — PREP PAD BNS: Brand: CONVERTORS

## (undated) DEVICE — SOCK SPEC L9IN WHT UNIV W/ STD PRT FOR FLD MGMT

## (undated) DEVICE — CONTINU-FLO SOLUTION SET, 2 INJECTION SITES, MALE LUER LOCK ADAPTER WITH RETRACTABLE COLLAR, LARGE BORE STOPCOCK WITH ROTATING MALE LUER LOCK EXTENSION SET, 2 INJECTION SITES, MALE LUER LOCK ADAPTER WITH RETRACTABLE COLLAR: Brand: INTERLINK/CONTINU-FLO

## (undated) DEVICE — COVER LT HNDL PLAS RIG 1 PER PK

## (undated) DEVICE — SOLUTION IRRIG 3000ML 0.9% SOD CHL FLX CONT 0797208] ICU MEDICAL INC]

## (undated) DEVICE — KENDALL DL ECG CABLE AND LEAD WIRE SYSTEM, 3-LEAD, SINGLE PATIENT USE: Brand: KENDALL

## (undated) DEVICE — STERILE POLYISOPRENE POWDER-FREE SURGICAL GLOVES: Brand: PROTEXIS

## (undated) DEVICE — 3M™ TEGADERM™ TRANSPARENT FILM DRESSING FRAME STYLE, 1624W, 2-3/8 IN X 2-3/4 IN (6 CM X 7 CM), 100/CT 4CT/CASE: Brand: 3M™ TEGADERM™